# Patient Record
Sex: FEMALE | Race: WHITE | Employment: PART TIME | URBAN - METROPOLITAN AREA
[De-identification: names, ages, dates, MRNs, and addresses within clinical notes are randomized per-mention and may not be internally consistent; named-entity substitution may affect disease eponyms.]

---

## 2017-06-17 ENCOUNTER — HOSPITAL ENCOUNTER (EMERGENCY)
Facility: CLINIC | Age: 37
Discharge: HOME | End: 2017-06-18
Attending: EMERGENCY MEDICINE | Admitting: EMERGENCY MEDICINE
Payer: OTHER MISCELLANEOUS

## 2017-06-17 VITALS
HEART RATE: 67 BPM | DIASTOLIC BLOOD PRESSURE: 67 MMHG | RESPIRATION RATE: 16 BRPM | OXYGEN SATURATION: 98 % | TEMPERATURE: 98.6 F | SYSTOLIC BLOOD PRESSURE: 97 MMHG

## 2017-06-17 DIAGNOSIS — Z57.8 EMPLOYEE EXPOSURE TO BODY FLUIDS: ICD-10-CM

## 2017-06-17 PROCEDURE — 99282 EMERGENCY DEPT VISIT SF MDM: CPT | Mod: Z6 | Performed by: EMERGENCY MEDICINE

## 2017-06-17 PROCEDURE — 99283 EMERGENCY DEPT VISIT LOW MDM: CPT | Performed by: EMERGENCY MEDICINE

## 2017-06-17 RX ORDER — PROPARACAINE HYDROCHLORIDE 5 MG/ML
SOLUTION/ DROPS OPHTHALMIC
Status: DISCONTINUED
Start: 2017-06-17 | End: 2017-06-17 | Stop reason: HOSPADM

## 2017-06-17 SDOH — HEALTH STABILITY - PHYSICAL HEALTH: OCCUPATIONAL EXPOSURE TO OTHER RISK FACTORS: Z57.8

## 2017-06-17 ASSESSMENT — ENCOUNTER SYMPTOMS: EYE PAIN: 1

## 2017-06-17 NOTE — ED AVS SNAPSHOT
[facility], Emergency Department    [address] [facility] [address]    Phone:  [phone]                                       Lise Ronquillo   MRN: [id number]    Department:  [facility], Emergency Department   Date of Visit:  6/17/2017           Patient Information     Date Of Birth          1980        Your diagnoses for this visit were:     Employee exposure to body fluids        You were seen by Trini Alford MD.        Discharge Instructions       FOLLOW-UP:  Please make an appointment to follow up with:  - Your Primary Care Provider and Employee Health Services (phone: [phone])    RETURN TO THE EMERGENCY DEPARTMENT  Return to the Emergency Department at any time for new/worsening symptoms.       Body Fluid Exposure (Healthcare Worker)  Two serious illnesses can be transmitted to a healthcare worker through body fluid exposure:    HIV    Hepatitis (types B, C and D)  Most healthcare workers exposed to a patient's body fluid do not get infected. However, exposure must be taken very seriously. Both HIV and hepatitis virus infection can lead to chronic illness and death.  Transmission risk depends on the type of exposure.     The risk of transmission following a needle stick from an HIV positive source is 0.3% (3 out of 1000 exposures).    The risk of transmission following a mucous membrane exposure to an HIV positive source is 0.09% (9 out of 10,000 exposures).    Transmission risk from a hepatitis-B positive source to a non-immunized worker following a needle-stick injury is 6% to 30% (6 to 30 out of 100 exposures).    Transmission risk from a hepatitis C positive source following a needle-stick injury is 0% to 7% (0 to 7 out of 100 exposures), and rare with mucous membrane exposure.  If you are in a sexual relationship, discuss your exposure and its risks with your partner. Consider abstaining from sex or using condoms and avoiding pregnancy until test results of the person  who exposed you is negative, or your follow-up testing is done. Do not donate blood, tissue, or semen. If you are a woman who is breast feeding, discuss the risks to your infant with your doctor.  Testing  Initial testing for HIV and hepatitis status will be done both on you and the source, if known. This will establish your HIV and hepatitis status today. If the source is positive or unknown, and your initial results are negative, you will need follow-up blood tests to find out if transmission has occurred. It can take up to 8 weeks for blood tests to turn positive for hepatitis. If HIV infection has occurred, the test usually becomes positive by 3 months after exposure, but a positive result could be delayed up to 6 months after exposure. Therefore, repeat HIV testing may be done in 6 and 12 weeks, and again at 6 months after exposure, according to your employer's policies. If tests are negative for hepatitis and HIV on final follow-up testing, you can assume that you were not infected as a result of this exposure.  Post-exposure prophylaxis (PEP)  If you have not already been immunized against hepatitis B, you will be offered the vaccine. If you have already been immunized, your antibody status will be determined. Treatment advice will be given based on your antibody status and that of the source patient, if known.   There is no preventive treatment or vaccine for hepatitis C or D.    Based on the time since exposure, the type of  exposure and the HIV status of the patient, if known, preventive treatment with antiviral medicine may be offered. Treatment consists of 2 or 3 oral medicines taken 1 to 3 times a day for 4 weeks. It is recommended to start the treatment as soon as possible after the exposure. Since treatment may be started before test results are known, treatment can be stopped if the source patient test results are negative.  Facts you need to know before making a treatment decision    There is only  limited information about the effectiveness of drugs used for post-exposure prophylaxis  and their toxicity in persons without HIV infection.    Although the short-term toxicity of anti-viral drugs is usually limited, serious adverse events have occurred.    Be sure you understand the risk of transmission of disease and the risks of treatment before making your decision. If you are not sure, you can discuss this further with the Employee Health Staff. They can guide you to additional resources.    You may refuse or stop post-exposure prophylaxis treatment at any time.  When to seek medical advice  Call your healthcare provider right away if any of these occur:     Unexplained fever over 100.4 F (38.0 C) or as advised    Swollen lymph glands    Sore throat    Rash    Muscle or joint aching    Prolonged or recurring diarrhea, nausea, or vomiting    Frequent headaches    Dark urine or light colored stools; or, jaundice (yellow color to skin or eyes)    Abdominal pain    Unusual and prolonged fatigue  Date Last Reviewed: 7/1/2016 2000-2017 [employer]. [address] [place] [address]. All rights reserved. This information is not intended as a substitute for professional medical care. Always follow your healthcare professional's instructions.            24 Hour Appointment Hotline       To make an appointment at any [facility], call [phone] ([phone]). If you don't have a family doctor or clinic, we will help you find one. [facility] are conveniently located to serve the needs of you and your family.             Review of your medicines      Our records show that you are taking the medicines listed below. If these are incorrect, please call your family doctor or clinic.        Dose / Directions Last dose taken    * ibuprofen 600 MG tablet   Commonly known as:  ADVIL/MOTRIN   Dose:  600 mg   Quantity:  30 tablet        Take 1 tablet (600 mg) by mouth every 6 hours as  needed for moderate pain   Refills:  1        * ibuprofen 600 MG tablet   Commonly known as:  ADVIL/MOTRIN   Dose:  600 mg   Quantity:  30 tablet        Take 1 tablet (600 mg) by mouth every 6 hours as needed for moderate pain   Refills:  1        levothyroxine 175 MCG tablet   Commonly known as:  SYNTHROID/LEVOTHROID   Dose:  175 mcg        Take 175 mcg by mouth daily   Refills:  0        MAGIC MOUTHWASH (ENTER INGREDIENTS IN COMMENTS)   Dose:  5-10 mL   Quantity:  160 mL        Swish and spit 5-10 mLs in mouth every 6 hours as needed Pharmacy please compound 4 grams of carafate susp in 30 ml of Benadryl (12.5 mg/5 ml), 60 ml Maalox and 30 ml Viscous Lidocaine   Refills:  0        * Notice:  This list has 2 medication(s) that are the same as other medications prescribed for you. Read the directions carefully, and ask your doctor or other care provider to review them with you.            Orders Needing Specimen Collection     None      Pending Results     No orders found from 6/15/2017 to 6/18/2017.            Pending Culture Results     No orders found from 6/15/2017 to 6/18/2017.            Pending Results Instructions     If you had any lab results that were not finalized at the time of your Discharge, you can call the ED Lab Result RN at [phone]. You will be contacted by this team for any positive Lab results or changes in treatment. The nurses are available 7 days a week from 10A to 6:30P.  You can leave a message 24 hours per day and they will return your call.        Thank you for choosing [facility]       Thank you for choosing [facility] for your care. Our goal is always to provide you with excellent care. Hearing back from our patients is one way we can continue to improve our services. Please take a few minutes to complete the written survey that you may receive in the mail after you visit with us. Thank you!        [facility] Information     [employer] lets you send messages to your doctor, view your test  "results, renew your prescriptions, schedule appointments and more. To sign up, go to [email or web address] . Click on \"Log in\" on the left side of the screen, which will take you to the Welcome page. Then click on \"Sign up Now\" on the right side of the page.     You will be asked to enter the access code listed below, as well as some personal information. Please follow the directions to create your username and password.     Your access code is: [email or web address]  Expires: 9/15/2017 10:29 PM     Your access code will  in 90 days. If you need help or a new code, please call your [facility] clinic or [phone].        Care EveryWhere ID     This is your Care EveryWhere ID. This could be used by other organizations to access your [facility] medical records  [id number]        After Visit Summary       This is your record. Keep this with you and show to your community pharmacist(s) and doctor(s) at your next visit.                  "

## 2017-06-17 NOTE — ED AVS SNAPSHOT
[facility], [place], Emergency Department    [address] [place] [address]    Phone:  [phone]                                       Lise Ronquillo   MRN: [id number]    Department:  [facility], Emergency Department   Date of Visit:  6/17/2017           After Visit Summary Signature Page     I have received my discharge instructions, and my questions have been answered. I have discussed any challenges I see with this plan with the nurse or doctor.    ..........................................................................................................................................  Patient/Patient Representative Signature      ..........................................................................................................................................  Patient Representative Print Name and Relationship to Patient    ..................................................               ................................................  Date                                            Time    ..........................................................................................................................................  Reviewed by Signature/Title    ...................................................              ..............................................  Date                                                            Time

## 2017-06-18 NOTE — ED PROVIDER NOTES
History     Chief Complaint   Patient presents with     Body Fluid Exposure     HPI  Lise Ronquillo is a 37 year old female who previously healthy presents to the ED with body fluid exposure. 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     She states she had exposure in both of her eyes and in her mouth. . She states she had stinging in her eyes right after the incident but before rinsing them.  Her vision is appropriate.  It's not significant eye pain and she's not noted any redness.  She does not believe that she scratched her eyes or rubs her eyes after this incident.  She has already irrigated with 500 cc prior to arrival.    She otherwise denies wearing contacts or glasses, mouth sores, or obvious blood in gastric contents that hit her. She states she is up to date with her tetanus, otherwise generally healthy. No eye/mouth conditions or concerns.     She does not believe source pt has HIV, hepatitis, etc. Source pt is not high risk to her knowledge, but the source pt does reportedly have cryptogenic cirrhosis which is being worked up currently/worked up for liver transplant.     No other new symptoms or complaints at this time.  Please see ROS for further details.    PAST MEDICAL HISTORY  Past Medical History:   Diagnosis Date     Hypothyroid      PAST SURGICAL HISTORY  History reviewed. No pertinent surgical history.  FAMILY HISTORY  No family history on file.  SOCIAL HISTORY  Social History   Substance Use Topics     Smoking status: Never Smoker     Smokeless tobacco: Never Used     Alcohol use No     MEDICATIONS  Current Facility-Administered Medications   Medication     proparacaine (ALCAINE) 0.5 % ophthalmic solution     Current Outpatient Prescriptions   Medication     levothyroxine (SYNTHROID, LEVOTHROID) 175 MCG tablet     ibuprofen (ADVIL,MOTRIN) 600 MG tablet     ibuprofen  (ADVIL,MOTRIN) 600 MG tablet     MAGIC MOUTHWASH, ENTER INGREDIENTS IN COMMENTS,     ALLERGIES  Allergies   Allergen Reactions     No Known Drug Allergies        I have reviewed the Medications, Allergies, Past Medical and Surgical History, and Social History in the Epic system.    Review of Systems   HENT: Negative for mouth sores.    Eyes: Positive for pain (stinging in both).   All other systems reviewed and are negative.      Physical Exam   BP: 97/67  Pulse: 67  Temp: 98.6  F (37  C)  Resp: 16  SpO2: 98 %  Physical Exam   Constitutional: She appears well-developed and well-nourished. She is active.  Non-toxic appearance. She does not have a sickly appearance. She does not appear ill. No distress.   HENT:   Head: Normocephalic and atraumatic. Head is without raccoon's eyes, without Gil's sign, without abrasion, without contusion, without laceration, without right periorbital erythema and without left periorbital erythema. Hair is normal.   Right Ear: Hearing and external ear normal.   Left Ear: Hearing and external ear normal.   Nose: Nose normal. No rhinorrhea. No epistaxis.   Mouth/Throat: Mucous membranes are normal.   No mouth sores, no cracks in lips, etc.   Eyes: Conjunctivae, EOM and lids are normal. Pupils are equal, round, and reactive to light. Right eye exhibits no chemosis, no discharge and no exudate. No foreign body present in the right eye. Left eye exhibits no chemosis, no discharge and no exudate. No foreign body present in the left eye. Right conjunctiva is not injected. Right conjunctiva has no hemorrhage. Left conjunctiva is not injected. Left conjunctiva has no hemorrhage. No scleral icterus. Right eye exhibits normal extraocular motion and no nystagmus. Left eye exhibits normal extraocular motion and no nystagmus. Pupils are equal.   Slit lamp exam:       The right eye shows no corneal abrasion, no corneal flare, no corneal ulcer, no foreign body and no fluorescein uptake.        The  left eye shows no corneal abrasion, no corneal flare, no corneal ulcer, no foreign body and no fluorescein uptake.   Neck: Phonation normal.   Cardiovascular: Intact distal pulses.  Exam reveals no gallop and no friction rub.    No murmur heard.  Pulmonary/Chest: Effort normal. No stridor. No respiratory distress. She exhibits no tenderness.   Musculoskeletal: Normal range of motion. She exhibits no edema or tenderness.        Neurological: She is alert. She is not disoriented. She displays no tremor. She exhibits normal muscle tone.   Skin: Skin is warm and dry. No rash noted. She is not diaphoretic. No erythema. No pallor.   Psychiatric: She has a normal mood and affect. Her behavior is normal. Judgment and thought content normal.          ED Course     ED Course   Comment By Time   ID says if rapid HIV negative, don't start HIV PEP, if positive do start it. No other emergent recommendations. Trini Alford MD 06/17 2110   Discussed case with Nursing Supervisor.  They're just tearing of this event now.  They will be contacting the floor C unit as well as coming down to discuss with the patient here and help coordinate cares. Trini Alford MD 06/17 2146   2nd round of flushing has been performed. Trini Alford MD 06/17 2147   Eyes feel much better after 2nd flushing with sterile water.  Fluorescein exam performed with no abnormal uptake or obvious corneal injuries Trini Alford MD 06/17 2202   Just waiting on Nursing Supervisor to let us know the source patient's rapid HIV test was positive to determine whether or not we would need to start the patient on HIV postexposure prophylaxis Trini Alford MD 06/17 2203   Called by Nursing Supervisor, source patients initial HIV testing is negative. Trini Alford MD 06/17 5598     Procedures   8:58 PM  The patient was seen and examined by Dr. 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  On examination she has no conjunctival injection, no obvious gross abnormalities, PERRL, EOMI. Even after irrigation with a liter she is still having some very mild bilateral eye irritation.     PLAN: Irrigate with sterile water, fluorescein exam, discuss w/ ID    RESULTS:  See ED Course section above for particular pertinent findings and comments  - Labs: Post-exposure labs being ordered, pending  - Eye/fluorescein/pH exam: No abnormal uptake or abnormalities appreciated on eye exam with fluorescein.  pH appropriate on pH/paper testing    INTERVENTIONS:   - Eye irrigation    RE-EVALUATION:  - Patient's eyes did feel better after the 2nd round of irrigation with sterile water    DISCUSSIONS:  Discussed case with ID, Nursing Supervisor  - w/ ID: They recommend treatment with HIV PEP only if source patient's rapid HIV test is positive.  - w/ Patient: I have reviewed the available findings, plan, need for follow up, and strict return instructions with the patient.    DISPOSITION/PLANNING:  - FINAL IMPRESSION: Body fluid exposure  - DISPOSITION: D/C   --- Follow-up: with PCP and Employee health  --- Recommendations: Strict return instructions      ______________________________________________________________________________    - I have reviewed the available nursing notes.      New Prescriptions    No medications on file       Final diagnoses:   None     IYasmany, am serving as a trained medical scribe to document services personally performed by Trini Alford MD, based on the provider's statements to me.      Trini SALINAS MD, was physically present and have reviewed and verified the accuracy of this note documented by Yasmany Pérez.    6/17/2017   [facility], [place], EMERGENCY DEPARTMENT     Trini Alford MD  06/17/17  2060

## 2017-06-18 NOTE — DISCHARGE INSTRUCTIONS
FOLLOW-UP:  Please make an appointment to follow up with:  - Your Primary Care Provider and Employee Health Services (phone: [phone])    RETURN TO THE EMERGENCY DEPARTMENT  Return to the Emergency Department at any time for new/worsening symptoms.       Body Fluid Exposure (Healthcare Worker)  Two serious illnesses can be transmitted to a healthcare worker through body fluid exposure:    HIV    Hepatitis (types B, C and D)  Most healthcare workers exposed to a patient's body fluid do not get infected. However, exposure must be taken very seriously. Both HIV and hepatitis virus infection can lead to chronic illness and death.  Transmission risk depends on the type of exposure.     The risk of transmission following a needle stick from an HIV positive source is 0.3% (3 out of 1000 exposures).    The risk of transmission following a mucous membrane exposure to an HIV positive source is 0.09% (9 out of 10,000 exposures).    Transmission risk from a hepatitis-B positive source to a non-immunized worker following a needle-stick injury is 6% to 30% (6 to 30 out of 100 exposures).    Transmission risk from a hepatitis C positive source following a needle-stick injury is 0% to 7% (0 to 7 out of 100 exposures), and rare with mucous membrane exposure.  If you are in a sexual relationship, discuss your exposure and its risks with your partner. Consider abstaining from sex or using condoms and avoiding pregnancy until test results of the person who exposed you is negative, or your follow-up testing is done. Do not donate blood, tissue, or semen. If you are a woman who is breast feeding, discuss the risks to your infant with your doctor.  Testing  Initial testing for HIV and hepatitis status will be done both on you and the source, if known. This will establish your HIV and hepatitis status today. If the source is positive or unknown, and your initial results are negative, you will need follow-up blood tests to find out if  transmission has occurred. It can take up to 8 weeks for blood tests to turn positive for hepatitis. If HIV infection has occurred, the test usually becomes positive by 3 months after exposure, but a positive result could be delayed up to 6 months after exposure. Therefore, repeat HIV testing may be done in 6 and 12 weeks, and again at 6 months after exposure, according to your employer's policies. If tests are negative for hepatitis and HIV on final follow-up testing, you can assume that you were not infected as a result of this exposure.  Post-exposure prophylaxis (PEP)  If you have not already been immunized against hepatitis B, you will be offered the vaccine. If you have already been immunized, your antibody status will be determined. Treatment advice will be given based on your antibody status and that of the source patient, if known.   There is no preventive treatment or vaccine for hepatitis C or D.    Based on the time since exposure, the type of  exposure and the HIV status of the patient, if known, preventive treatment with antiviral medicine may be offered. Treatment consists of 2 or 3 oral medicines taken 1 to 3 times a day for 4 weeks. It is recommended to start the treatment as soon as possible after the exposure. Since treatment may be started before test results are known, treatment can be stopped if the source patient test results are negative.  Facts you need to know before making a treatment decision    There is only limited information about the effectiveness of drugs used for post-exposure prophylaxis  and their toxicity in persons without HIV infection.    Although the short-term toxicity of anti-viral drugs is usually limited, serious adverse events have occurred.    Be sure you understand the risk of transmission of disease and the risks of treatment before making your decision. If you are not sure, you can discuss this further with the Employee Health Staff. They can guide you to additional  resources.    You may refuse or stop post-exposure prophylaxis treatment at any time.  When to seek medical advice  Call your healthcare provider right away if any of these occur:     Unexplained fever over 100.4 F (38.0 C) or as advised    Swollen lymph glands    Sore throat    Rash    Muscle or joint aching    Prolonged or recurring diarrhea, nausea, or vomiting    Frequent headaches    Dark urine or light colored stools; or, jaundice (yellow color to skin or eyes)    Abdominal pain    Unusual and prolonged fatigue  Date Last Reviewed: 7/1/2016 2000-2017 [employer]. [address] [place] [address]. All rights reserved. This information is not intended as a substitute for professional medical care. Always follow your healthcare professional's instructions.

## 2017-06-18 NOTE — ED NOTES
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 Patient was exposed to bodily fluid when gastric contents sprayed into her mouth and eye from a GJtube. Eye was rinsed with 500cc NS upon arrival to ED.

## 2018-11-08 ENCOUNTER — OFFICE VISIT (OUTPATIENT)
Dept: DERMATOLOGY | Facility: CLINIC | Age: 38
End: 2018-11-08
Payer: COMMERCIAL

## 2018-11-08 DIAGNOSIS — D48.5 NEOPLASM OF UNCERTAIN BEHAVIOR OF SKIN: Primary | ICD-10-CM

## 2018-11-08 DIAGNOSIS — L29.9 PRURITUS: ICD-10-CM

## 2018-11-08 DIAGNOSIS — L21.9 DERMATITIS, SEBORRHEIC: ICD-10-CM

## 2018-11-08 DIAGNOSIS — L30.9 ECZEMA, UNSPECIFIED TYPE: ICD-10-CM

## 2018-11-08 LAB
BASOPHILS # BLD AUTO: 0.1 10E9/L (ref 0–0.2)
BASOPHILS NFR BLD AUTO: 2.4 %
DIFFERENTIAL METHOD BLD: NORMAL
EOSINOPHIL # BLD AUTO: 0.1 10E9/L (ref 0–0.7)
EOSINOPHIL NFR BLD AUTO: 2.6 %
ERYTHROCYTE [DISTWIDTH] IN BLOOD BY AUTOMATED COUNT: 13.5 % (ref 10–15)
FERRITIN SERPL-MCNC: 177 NG/ML (ref 12–150)
HCT VFR BLD AUTO: 44.6 % (ref 35–47)
HGB BLD-MCNC: 15 G/DL (ref 11.7–15.7)
IMM GRANULOCYTES # BLD: 0 10E9/L (ref 0–0.4)
IMM GRANULOCYTES NFR BLD: 0.2 %
IRON SATN MFR SERPL: 25 % (ref 15–46)
IRON SERPL-MCNC: 69 UG/DL (ref 35–180)
LYMPHOCYTES # BLD AUTO: 1.7 10E9/L (ref 0.8–5.3)
LYMPHOCYTES NFR BLD AUTO: 39.5 %
MCH RBC QN AUTO: 30.2 PG (ref 26.5–33)
MCHC RBC AUTO-ENTMCNC: 33.6 G/DL (ref 31.5–36.5)
MCV RBC AUTO: 90 FL (ref 78–100)
MONOCYTES # BLD AUTO: 0.5 10E9/L (ref 0–1.3)
MONOCYTES NFR BLD AUTO: 12.2 %
NEUTROPHILS # BLD AUTO: 1.8 10E9/L (ref 1.6–8.3)
NEUTROPHILS NFR BLD AUTO: 43.1 %
NRBC # BLD AUTO: 0 10*3/UL
NRBC BLD AUTO-RTO: 0 /100
PLATELET # BLD AUTO: 296 10E9/L (ref 150–450)
RBC # BLD AUTO: 4.97 10E12/L (ref 3.8–5.2)
T4 FREE SERPL-MCNC: 1.97 NG/DL (ref 0.76–1.46)
TIBC SERPL-MCNC: 280 UG/DL (ref 240–430)
TSH SERPL DL<=0.005 MIU/L-ACNC: 0.09 MU/L (ref 0.4–4)
WBC # BLD AUTO: 4.3 10E9/L (ref 4–11)

## 2018-11-08 RX ORDER — LIDOCAINE HYDROCHLORIDE AND EPINEPHRINE 10; 10 MG/ML; UG/ML
3 INJECTION, SOLUTION INFILTRATION; PERINEURAL ONCE
Qty: 3 ML | Refills: 0 | OUTPATIENT
Start: 2018-11-08 | End: 2019-02-27

## 2018-11-08 RX ORDER — KETOCONAZOLE 20 MG/ML
SHAMPOO TOPICAL DAILY PRN
Qty: 120 ML | Refills: 11 | Status: SHIPPED | OUTPATIENT
Start: 2018-11-08

## 2018-11-08 RX ORDER — FLUOCINONIDE TOPICAL SOLUTION USP, 0.05% 0.5 MG/ML
SOLUTION TOPICAL 2 TIMES DAILY
Qty: 60 ML | Refills: 3 | Status: SHIPPED | OUTPATIENT
Start: 2018-11-08

## 2018-11-08 RX ORDER — TRIAMCINOLONE ACETONIDE 1 MG/G
OINTMENT TOPICAL 2 TIMES DAILY
Qty: 454 G | Refills: 3 | Status: SHIPPED | OUTPATIENT
Start: 2018-11-08

## 2018-11-08 ASSESSMENT — PAIN SCALES - GENERAL
PAINLEVEL: NO PAIN (0)
PAINLEVEL: NO PAIN (0)

## 2018-11-08 NOTE — LETTER
11/8/2018       RE: Lise Ronquillo  [address]  [place] [address]     Dear Colleague,    Thank you for referring your patient, Lise Ronquillo, to the [facility] DERMATOLOGY at [facility]. Please see a copy of my visit note below.    [facility] Dermatology Note      Dermatology Problem List:  1. Benign nevi, s/p biopsy, outside facility, ~ 2007  2. Eczematous dermatitis with atopic diathesis  - involving antecubital fossae,vulvar skin, triamcinolone ointment BID to trunk and body   - lab evaluation today (11/8/18): CBC, TSH, iron studies, IgE  - patch test referral  3. Seborrheic dermatitis, fluocinonide solution and ketoconazole shampoo  4. Pigmented macule, NUB, s/p shave biopsy 11/8/18, path pending    Encounter Date: Nov 8, 2018    CC:   Chief Complaint   Patient presents with     Skin Check     Lise is here today for a skin check- notes some areas of concern.      History of Present Illness:  Ms. Lise Ronquillo is a 38 year old female who presents for skin check and in addition has some itchy spots. Her father has a history of eczema. She is not sure if she has a past history of eczema but has no seasonal allergies and no asthma. Her mom had skin cancer which she believes was NMSC. She feels overall well today.  She states she currently has itchy spots on her skin, marisel itchy external auditory canal and the skin around her left axilla. She will scratch this area on her chest by the axilla constantly.  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 She has tried eczema and psoriasis lotions and coconut oil. No topical steroids. She has similarly itchy vulvar skin and has tried over the counter terbinafine and butenafine topically. She had laser hair removal of under arms and bikini, last treatment 2012 ( 7 total treatments). She is otherwise well today with no additional skin  complaints.     Past Medical History:   Patient Active Problem List   Diagnosis     Anorexia     Past Medical History:   Diagnosis Date     Hypothyroid      History reviewed. No pertinent surgical history.    Social History:  Patient  reports that she has never smoked. She has never used smokeless tobacco. She reports that she does not drink alcohol or use illicit drugs.    Family History:  Family History   Problem Relation Age of Onset     Skin Cancer Mother      Melanoma No family hx of        Medications:  Current Outpatient Prescriptions   Medication Sig Dispense Refill     cholecalciferol (VITAMIN D3) 1000 UNIT tablet Take 1,000 Units by mouth       levothyroxine (SYNTHROID, LEVOTHROID) 175 MCG tablet Take 175 mcg by mouth daily       ibuprofen (ADVIL,MOTRIN) 600 MG tablet Take 1 tablet (600 mg) by mouth every 6 hours as needed for moderate pain (Patient not taking: Reported on 11/8/2018) 30 tablet 1     ibuprofen (ADVIL,MOTRIN) 600 MG tablet Take 1 tablet (600 mg) by mouth every 6 hours as needed for moderate pain (Patient not taking: Reported on 11/8/2018) 30 tablet 1     MAGIC MOUTHWASH, ENTER INGREDIENTS IN COMMENTS, Swish and spit 5-10 mLs in mouth every 6 hours as needed Pharmacy please compound 4 grams of carafate susp in 30 ml of Benadryl (12.5 mg/5 ml), 60 ml Maalox and 30 ml Viscous Lidocaine (Patient not taking: Reported on 11/8/2018) 160 mL 0        Allergies   Allergen Reactions     No Known Drug Allergies          Review of Systems:  -As per HPI  -Constitutional: The patient denies fatigue, fevers, chills, unintended weight loss, and night sweats.  -Skin: As above in HPI. No additional skin concerns.    Physical exam:  Vitals: There were no vitals taken for this visit.  GEN: This is a well developed, well-nourished female in no acute distress, in a pleasant mood.    SKIN: Total skin excluding the undergarment areas was performed. The exam included the head/face, neck, both arms, chest, back,  abdomen, both legs, digits and/or nails.   -There are lichenified thick pink scaly patches and plaques on the left upper chest near the axilla, lichenified vulvar skin.  - Rex naomi lines involving infraorbital skin  - densely pigmented dark brown/black macule  Of left medial thigh  - xerosis diffusely  - erythema of the scalp with perifollicular scale  -No other lesions of concern on areas examined.     Impression/Plan:  1. Seborrheic dermatitis    We will have her apply ketoconazole shampoo 2-3X per week to the scalp in the shower, leave on for 5 minutes, then wash out alternating with Fluocinonide solution 2-3 X weekly    2. Eczematous dermatitis    Axillary skin (left)- triamcinolone ointment BID two weeks, four week follow up    Ear canal use fluocinonide solution    Vulvar lichenification- triamcinolone ointment BID two weeks     Lab evaluation: including CBC, iron studies, TSH, IgE level    Ref for patch testing today     Follow up in 1 month    3. Neoplasm of uncertain behavior on the left medial thigh. The differential diagnosis includes nevus vs other.     Shave biopsy:  After discussion of benefits and risks including but not limited to bleeding/bruising, pain/swelling, infection, scar, incomplete removal, nerve damage/numbness, recurrence, and non-diagnostic biopsy, written consent, verbal consent and photographs were obtained. Time-out was performed. The area was cleaned with isopropyl alcohol.  was injected to obtain adequate anesthesia of the lesion on the left medial thigh. 0.5ml of 1% lidocaine with 1:100,000 epinephrine was injected to obtain adequate anesthesia. A  shave biopsy was performed. Hemostasis was achieved with aluminium chloride. Vaseline and a sterile dressing were applied. The patient tolerated the procedure and no complications were noted. The patient was provided with verbal and written post care instructions.        Follow-up in 1 months, earlier for new or changing lesions.        Dr. Dumont staffed the patient.    Staff Involved:  Resident(Donna Reynoso)/Staff(as above)      Patient was seen and examined with the dermatology resident. I agree with the history, review of systems, physical examination, assessments and plan. I was present for the key portion of the biopsy procedure.    Johanne Dumont MD  Professor and  Chair  Department of Dermatology  [facility]      Again, thank you for allowing me to participate in the care of your patient.      Sincerely,    Donna Reynoso MD

## 2018-11-08 NOTE — MR AVS SNAPSHOT
After Visit Summary   11/8/2018    Lise Ronquillo    MRN: [id number]           Patient Information     Date Of Birth          1980        Visit Information        Provider Department      11/8/2018 9:30 AM Donna Reynoso MD [facility] Dermatology        Today's Diagnoses     Neoplasm of uncertain behavior of skin    -  1    Pruritus        Eczema, unspecified type        Dermatitis, seborrheic          Care Instructions    For your scalp:  -fluocinonide solution 2-3X weekly  -Please apply 2-3X per week to the scalp in the shower, leave on for 5 minutes, then wash out    For your ear:  -you may apply the fluocinonide solution as needed    For your body and vulvar skin:  -you may apply triamcinolone ointment twice daily for two weeks (continue if necessary for another two week) then try and decrease to once daily for two weeks, etc. Then as needed.     For your itching in general:  -we will do some lab work today : CBC, TSH, iron studies, and IgE  - we will refer you for patch testing      Follow up in one month, sooner if needed.    We will contact you within two weeks with your biopsy results.     Wound Care After a Biopsy    What is a skin biopsy?  A skin biopsy allows the doctor to examine a very small piece of tissue under the microscope to determine the diagnosis and the best treatment for the skin condition. A local anesthetic (numbing medicine)  is injected with a very small needle into the skin area to be tested. A small piece of skin is taken from the area. Sometimes a suture (stitch) is used.     What are the risks of a skin biopsy?  I will experience scar, bleeding, swelling, pain, crusting and redness. I may experience incomplete removal or recurrence. Risks of this procedure are excessive bleeding, bruising, infection, nerve damage, numbness, thick (hypertrophic or keloidal) scar and non-diagnostic biopsy.    How should I care for my wound for the first 24 hours?    Keep the  wound dry and covered for 24 hours    If it bleeds, hold direct pressure on the area for 15 minutes. If bleeding does not stop then go to the emergency room    Avoid strenuous exercise the first 1-2 days or as your doctor instructs you    How should I care for the wound after 24 hours?    After 24 hours, remove the bandage    You may bathe or shower as normal    If you had a scalp biopsy, you can shampoo as usual and can use shower water to clean the biopsy site daily    Clean the wound twice a day with gentle soap and water    Do not scrub, be gentle    Apply white petroleum/Vaseline after cleaning the wound with a cotton swab or a clean finger, and keep the site covered with a Bandaid /bandage. Bandages are not necessary with a scalp biopsy    If you are unable to cover the site with a Bandaid /bandage, re-apply ointment 2-3 times a day to keep the site moist. Moisture will help with healing    Avoid strenuous activity for first 1-2 days    Avoid lakes, rivers, pools, and oceans until the stitches are removed or the site is healed    How do I clean my wound?    Wash hands thoroughly with soap or use hand  before all wound care    Clean the wound with gentle soap and water    Apply white petroleum/Vaseline  to wound after it is clean    Replace the Bandaid /bandage to keep the wound covered for the first few days or as instructed by your doctor    If you had a scalp biopsy, warm shower water to the area on a daily basis should suffice    What should I use to clean my wound?     Cotton-tipped applicators (Qtips )    White petroleum jelly (Vaseline ). Use a clean new container and use Q-tips to apply.    Bandaids   as needed    Gentle soap     How should I care for my wound long term?    Do not get your wound dirty    Keep up with wound care for one week or until the area is healed.    A small scab will form and fall off by itself when the area is completely healed. The area will be red and will become pink  in color as it heals. Sun protection is very important for how your scar will turn out. Sunscreen with an SPF 30 or greater is recommended once the area is healed.    You should have some soreness but it should be mild and slowly go away over several days. Talk to your doctor about using tylenol for pain,    When should I call my doctor?  If you have increased:     Pain or swelling    Pus or drainage (clear or slightly yellow drainage is ok)    Temperature over 100F    Spreading redness or warmth around wound    When will I hear about my results?  The biopsy results can take 2-3 weeks to come back. The clinic will call you with the results, send you a Sling message, or have you schedule a follow-up clinic or phone time to discuss the results. Contact our clinics if you do not hear from us in 3 weeks.     Who should I call with questions?    [facility]: [phone]     [facility]: [phone]    For urgent needs outside of business hours call the [facility] at [phone] and ask for the dermatology resident on call              Follow-ups after your visit        Follow-up notes from your care team     Return in about 4 weeks (around 12/6/2018).      Future tests that were ordered for you today     Open Future Orders        Priority Expected Expires Ordered    CBC with platelets differential Routine  11/8/2019 11/8/2018    Iron and iron binding capacity Routine  11/8/2019 11/8/2018    Ferritin Routine  11/8/2019 11/8/2018    IgE Routine  11/8/2019 11/8/2018    TSH with free T4 reflex Routine  11/8/2019 11/8/2018            Who to contact     Please call your clinic at [phone] to:    Ask questions about your health    Make or cancel appointments    Discuss your medicines    Learn about your test results    Speak to your doctor            Additional Information About Your Visit        Your [employer] Player Connect Information     [email or web address] is an electronic  gateway that provides easy, online access to your medical records. With [employer], you can request a clinic appointment, read your test results, renew a prescription or communicate with your care team.     To sign up for [employer] visit the website at [email or web address]   You will be asked to enter the access code listed below, as well as some personal information. Please follow the directions to create your username and password.     Your access code is: [email or web address]  Expires: 2019  9:24 AM     Your access code will  in 90 days. If you need help or a new code, please contact your [facility] or call [phone] for assistance.        Care EveryWhere ID     This is your Care EveryWhere ID. This could be used by other organizations to access your [place] medical records  [id number]         Blood Pressure from Last 3 Encounters:   17 97/67   16 94/62   09/12/15 114/68    Weight from Last 3 Encounters:   16 73.4 kg (161 lb 12.8 oz)   09/12/15 63.9 kg (140 lb 14.4 oz)   07/02/15 60.5 kg (133 lb 4.8 oz)              We Performed the Following     BIOPSY SKIN/SUBQ/MUC MEM, SINGLE LESION     Dermatological path order and indications          Today's Medication Changes          These changes are accurate as of 18 10:30 AM.  If you have any questions, ask your nurse or doctor.               Start taking these medicines.        Dose/Directions    fluocinonide 0.05 % solution   Commonly known as:  LIDEX   Used for:  Eczema, unspecified type, Dermatitis, seborrheic   Started by:  Donna Reynoso MD        Apply topically 2 times daily   Quantity:  60 mL   Refills:  3       ketoconazole 2 % shampoo   Commonly known as:  NIZORAL   Used for:  Dermatitis, seborrheic   Started by:  Donna Reynoso MD        Apply topically daily as needed for itching or irritation   Quantity:  120 mL   Refills:  11       lidocaine 1% with EPINEPHrine 1:100,000 1  %-1:007776 injection   Used for:  Neoplasm of uncertain behavior of skin   Started by:  Donna Reynoso MD        Dose:  3 mL   Inject 3 mLs into the skin once for 1 dose   Quantity:  3 mL   Refills:  0       triamcinolone 0.1 % ointment   Commonly known as:  KENALOG   Used for:  Eczema, unspecified type   Started by:  Donna Reynoso MD        Apply topically 2 times daily   Quantity:  454 g   Refills:  3            Where to get your medicines      These medications were sent to [place], [place] - [address]  [address], [place] [address]    Hours:  TRANSPLANT PHONE NUMBER [phone] Phone:  [phone]     fluocinonide 0.05 % solution    ketoconazole 2 % shampoo    triamcinolone 0.1 % ointment         Some of these will need a paper prescription and others can be bought over the counter.  Ask your nurse if you have questions.     You don't need a prescription for these medications     lidocaine 1% with EPINEPHrine 1:100,000 1 %-1:[id number] injection                Primary Care Provider Fax #    Physician No Ref-Primary [phone]       No address on file        Equal Access to Services     DUSTIN YEN AH: Hadii mayra solitario Soanuja, waaxda luqadaha, qaybta kaalmada adeegyada, americo tobias. So [facility] [phone].    ATENCIÓN: Si habla español, tiene a new disposición servicios gratHoly Cross Hospitalos de asistencia lingüística. Llame al [phone].    We comply with applicable federal civil rights laws and [place] laws. We do not discriminate on the basis of race, color, national origin, age, disability, sex, sexual orientation, or gender identity.            Thank you!     Thank you for choosing [facility] DERMATOLOGY  for your care. Our goal is always to provide you with excellent care. Hearing back from our patients is one way we can continue to improve our services. Please take a few minutes to complete the  written survey that you may receive in the mail after your visit with us. Thank you!             Your Updated Medication List - Protect others around you: Learn how to safely use, store and throw away your medicines at www.disposemymeds.org.          This list is accurate as of 11/8/18 10:30 AM.  Always use your most recent med list.                   Brand Name Dispense Instructions for use Diagnosis    cholecalciferol 1000 UNIT tablet    vitamin D3     Take 1,000 Units by mouth        fluocinonide 0.05 % solution    LIDEX    60 mL    Apply topically 2 times daily    Eczema, unspecified type, Dermatitis, seborrheic       * ibuprofen 600 MG tablet    ADVIL/MOTRIN    30 tablet    Take 1 tablet (600 mg) by mouth every 6 hours as needed for moderate pain    Headache(784.0), Acute pharyngitis, unspecified pharyngitis type, Mouth pain       * ibuprofen 600 MG tablet    ADVIL/MOTRIN    30 tablet    Take 1 tablet (600 mg) by mouth every 6 hours as needed for moderate pain    Pain and swelling of left ankle, Left ankle pain       ketoconazole 2 % shampoo    NIZORAL    120 mL    Apply topically daily as needed for itching or irritation    Dermatitis, seborrheic       levothyroxine 175 MCG tablet    SYNTHROID/LEVOTHROID     Take 175 mcg by mouth daily        lidocaine 1% with EPINEPHrine 1:100,000 1 %-1:092439 injection     3 mL    Inject 3 mLs into the skin once for 1 dose    Neoplasm of uncertain behavior of skin       magic mouthwash suspension    ENTER INGREDIENTS IN COMMENTS    160 mL    Swish and spit 5-10 mLs in mouth every 6 hours as needed Pharmacy please compound 4 grams of carafate susp in 30 ml of Benadryl (12.5 mg/5 ml), 60 ml Maalox and 30 ml Viscous Lidocaine    Acute pharyngitis, unspecified pharyngitis type, Mouth pain       triamcinolone 0.1 % ointment    KENALOG    454 g    Apply topically 2 times daily    Eczema, unspecified type       * Notice:  This list has 2 medication(s) that are the same as other  medications prescribed for you. Read the directions carefully, and ask your doctor or other care provider to review them with you.

## 2018-11-08 NOTE — PATIENT INSTRUCTIONS
For your scalp:  -fluocinonide solution 2-3X weekly  -Please apply 2-3X per week to the scalp in the shower, leave on for 5 minutes, then wash out    For your ear:  -you may apply the fluocinonide solution as needed    For your body and vulvar skin:  -you may apply triamcinolone ointment twice daily for two weeks (continue if necessary for another two week) then try and decrease to once daily for two weeks, etc. Then as needed.     For your itching in general:  -we will do some lab work today : CBC, TSH, iron studies, and IgE  - we will refer you for patch testing      Follow up in one month, sooner if needed.    We will contact you within two weeks with your biopsy results.     Wound Care After a Biopsy    What is a skin biopsy?  A skin biopsy allows the doctor to examine a very small piece of tissue under the microscope to determine the diagnosis and the best treatment for the skin condition. A local anesthetic (numbing medicine)  is injected with a very small needle into the skin area to be tested. A small piece of skin is taken from the area. Sometimes a suture (stitch) is used.     What are the risks of a skin biopsy?  I will experience scar, bleeding, swelling, pain, crusting and redness. I may experience incomplete removal or recurrence. Risks of this procedure are excessive bleeding, bruising, infection, nerve damage, numbness, thick (hypertrophic or keloidal) scar and non-diagnostic biopsy.    How should I care for my wound for the first 24 hours?    Keep the wound dry and covered for 24 hours    If it bleeds, hold direct pressure on the area for 15 minutes. If bleeding does not stop then go to the emergency room    Avoid strenuous exercise the first 1-2 days or as your doctor instructs you    How should I care for the wound after 24 hours?    After 24 hours, remove the bandage    You may bathe or shower as normal    If you had a scalp biopsy, you can shampoo as usual and can use shower water to clean the  biopsy site daily    Clean the wound twice a day with gentle soap and water    Do not scrub, be gentle    Apply white petroleum/Vaseline after cleaning the wound with a cotton swab or a clean finger, and keep the site covered with a Bandaid /bandage. Bandages are not necessary with a scalp biopsy    If you are unable to cover the site with a Bandaid /bandage, re-apply ointment 2-3 times a day to keep the site moist. Moisture will help with healing    Avoid strenuous activity for first 1-2 days    Avoid lakes, rivers, pools, and oceans until the stitches are removed or the site is healed    How do I clean my wound?    Wash hands thoroughly with soap or use hand  before all wound care    Clean the wound with gentle soap and water    Apply white petroleum/Vaseline  to wound after it is clean    Replace the Bandaid /bandage to keep the wound covered for the first few days or as instructed by your doctor    If you had a scalp biopsy, warm shower water to the area on a daily basis should suffice    What should I use to clean my wound?     Cotton-tipped applicators (Qtips )    White petroleum jelly (Vaseline ). Use a clean new container and use Q-tips to apply.    Bandaids   as needed    Gentle soap     How should I care for my wound long term?    Do not get your wound dirty    Keep up with wound care for one week or until the area is healed.    A small scab will form and fall off by itself when the area is completely healed. The area will be red and will become pink in color as it heals. Sun protection is very important for how your scar will turn out. Sunscreen with an SPF 30 or greater is recommended once the area is healed.    You should have some soreness but it should be mild and slowly go away over several days. Talk to your doctor about using tylenol for pain,    When should I call my doctor?  If you have increased:     Pain or swelling    Pus or drainage (clear or slightly yellow drainage is  ok)    Temperature over 100F    Spreading redness or warmth around wound    When will I hear about my results?  The biopsy results can take 2-3 weeks to come back. The clinic will call you with the results, send you a Mindiet message, or have you schedule a follow-up clinic or phone time to discuss the results. Contact our clinics if you do not hear from us in 3 weeks.     Who should I call with questions?    [facility]: [phone]     [facility]: [phone]    For urgent needs outside of business hours call the [facility] at [phone] and ask for the dermatology resident on call

## 2018-11-08 NOTE — LETTER
Date:November 30, 2018      Patient was self referred, no letter generated. Do not send.        [facility] Information

## 2018-11-08 NOTE — PROGRESS NOTES
[facility] Dermatology Note      Dermatology Problem List:  1. Benign nevi, s/p biopsy, outside facility, ~ 2007  2. Eczematous dermatitis with atopic diathesis  - involving antecubital fossae,vulvar skin, triamcinolone ointment BID to trunk and body   - lab evaluation today (11/8/18): CBC, TSH, iron studies, IgE  - patch test referral  3. Seborrheic dermatitis, fluocinonide solution and ketoconazole shampoo  4. Pigmented macule, NUB, s/p shave biopsy 11/8/18, path pending    Encounter Date: Nov 8, 2018    CC:   Chief Complaint   Patient presents with     Skin Check     Lise is here today for a skin check- notes some areas of concern.      History of Present Illness:  Ms. Lise Ronquillo is a 38 year old female who presents for skin check and in addition has some itchy spots. Her father has a history of eczema. She is not sure if she has a past history of eczema but has no seasonal allergies and no asthma. Her mom had skin cancer which she believes was NMSC. She feels overall well today.  She states she currently has itchy spots on her skin, marisel itchy external auditory canal and the skin around her left axilla. She will scratch this area on her chest by the axilla constantly.  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 She has tried eczema and psoriasis lotions and coconut oil. No topical steroids. She has similarly itchy vulvar skin and has tried over the counter terbinafine and butenafine topically. She had laser hair removal of under arms and bikini, last treatment 2012 ( 7 total treatments). She is otherwise well today with no additional skin complaints.     Past Medical History:   Patient Active Problem List   Diagnosis     Anorexia     Past Medical History:   Diagnosis Date     Hypothyroid      History reviewed. No pertinent surgical history.    Social History:  Patient  reports that she has never smoked. She has never used smokeless  tobacco. She reports that she does not drink alcohol or use illicit drugs.    Family History:  Family History   Problem Relation Age of Onset     Skin Cancer Mother      Melanoma No family hx of        Medications:  Current Outpatient Prescriptions   Medication Sig Dispense Refill     cholecalciferol (VITAMIN D3) 1000 UNIT tablet Take 1,000 Units by mouth       levothyroxine (SYNTHROID, LEVOTHROID) 175 MCG tablet Take 175 mcg by mouth daily       ibuprofen (ADVIL,MOTRIN) 600 MG tablet Take 1 tablet (600 mg) by mouth every 6 hours as needed for moderate pain (Patient not taking: Reported on 11/8/2018) 30 tablet 1     ibuprofen (ADVIL,MOTRIN) 600 MG tablet Take 1 tablet (600 mg) by mouth every 6 hours as needed for moderate pain (Patient not taking: Reported on 11/8/2018) 30 tablet 1     MAGIC MOUTHWASH, ENTER INGREDIENTS IN COMMENTS, Swish and spit 5-10 mLs in mouth every 6 hours as needed Pharmacy please compound 4 grams of carafate susp in 30 ml of Benadryl (12.5 mg/5 ml), 60 ml Maalox and 30 ml Viscous Lidocaine (Patient not taking: Reported on 11/8/2018) 160 mL 0        Allergies   Allergen Reactions     No Known Drug Allergies          Review of Systems:  -As per HPI  -Constitutional: The patient denies fatigue, fevers, chills, unintended weight loss, and night sweats.  -Skin: As above in HPI. No additional skin concerns.    Physical exam:  Vitals: There were no vitals taken for this visit.  GEN: This is a well developed, well-nourished female in no acute distress, in a pleasant mood.    SKIN: Total skin excluding the undergarment areas was performed. The exam included the head/face, neck, both arms, chest, back, abdomen, both legs, digits and/or nails.   -There are lichenified thick pink scaly patches and plaques on the left upper chest near the axilla, lichenified vulvar skin.  - Rex naomi lines involving infraorbital skin  - densely pigmented dark brown/black macule  Of left medial thigh  - xerosis  diffusely  - erythema of the scalp with perifollicular scale  -No other lesions of concern on areas examined.     Impression/Plan:  1. Seborrheic dermatitis    We will have her apply ketoconazole shampoo 2-3X per week to the scalp in the shower, leave on for 5 minutes, then wash out alternating with Fluocinonide solution 2-3 X weekly    2. Eczematous dermatitis    Axillary skin (left)- triamcinolone ointment BID two weeks, four week follow up    Ear canal use fluocinonide solution    Vulvar lichenification- triamcinolone ointment BID two weeks     Lab evaluation: including CBC, iron studies, TSH, IgE level    Ref for patch testing today     Follow up in 1 month    3. Neoplasm of uncertain behavior on the left medial thigh. The differential diagnosis includes nevus vs other.     Shave biopsy:  After discussion of benefits and risks including but not limited to bleeding/bruising, pain/swelling, infection, scar, incomplete removal, nerve damage/numbness, recurrence, and non-diagnostic biopsy, written consent, verbal consent and photographs were obtained. Time-out was performed. The area was cleaned with isopropyl alcohol.  was injected to obtain adequate anesthesia of the lesion on the left medial thigh. 0.5ml of 1% lidocaine with 1:100,000 epinephrine was injected to obtain adequate anesthesia. A  shave biopsy was performed. Hemostasis was achieved with aluminium chloride. Vaseline and a sterile dressing were applied. The patient tolerated the procedure and no complications were noted. The patient was provided with verbal and written post care instructions.        Follow-up in 1 months, earlier for new or changing lesions.       Dr. Dumont staffed the patient.    Staff Involved:  Resident(Donna Reynoso)/Staff(as above)      Patient was seen and examined with the dermatology resident. I agree with the history, review of systems, physical examination, assessments and plan. I was present for the key portion of the  biopsy procedure.    Johanne Dumont MD  Professor and  Chair  Department of Dermatology  [facility]

## 2018-11-08 NOTE — NURSING NOTE
Lidocaine-epinephrine 1-1:196907 % injection   05mL once for one use, starting 11/8/2018 ending 11/8/2018,  2mL disp, R-0, injection  Injected by Dr. Reynoso

## 2018-11-11 ENCOUNTER — HEALTH MAINTENANCE LETTER (OUTPATIENT)
Age: 38
End: 2018-11-11

## 2018-11-12 ENCOUNTER — PRE VISIT (OUTPATIENT)
Dept: DERMATOLOGY | Facility: CLINIC | Age: 38
End: 2018-11-12

## 2018-11-12 LAB — IGE SERPL-ACNC: 48 KIU/L (ref 0–114)

## 2018-11-14 LAB — COPATH REPORT: NORMAL

## 2018-11-15 ENCOUNTER — TELEPHONE (OUTPATIENT)
Dept: DERMATOLOGY | Facility: CLINIC | Age: 38
End: 2018-11-15

## 2018-11-15 NOTE — TELEPHONE ENCOUNTER
FUTURE VISIT INFORMATION      FUTURE VISIT INFORMATION:    Date: 11/19    Time: 8a    Location: Derm  REFERRAL INFORMATION:    Referring provider:       Referring providers clinic:       Reason for visit/diagnosis  NAL    RECORDS REQUESTED FROM:       Clinic name Comments Records Status Imaging Status                                         Records Internal

## 2018-11-15 NOTE — TELEPHONE ENCOUNTER
Left voicemail regarding appointment on 11/19/2018 at 8 AM. Call back number provided for questions or rescheduling.

## 2018-11-19 ENCOUNTER — PRE VISIT (OUTPATIENT)
Dept: DERMATOLOGY | Facility: CLINIC | Age: 38
End: 2018-11-19

## 2018-11-27 NOTE — TELEPHONE ENCOUNTER
FUTURE VISIT INFORMATION      FUTURE VISIT INFORMATION:    Date: 12/3    Time: 9a    Location: Derm  REFERRAL INFORMATION:    Referring provider:  per patient    Referring providers clinic:       Reason for visit/diagnosis  NAL    RECORDS REQUESTED FROM:       Clinic name Comments Records Status Imaging Status                                         All Records Internal

## 2018-11-29 ENCOUNTER — TELEPHONE (OUTPATIENT)
Dept: DERMATOLOGY | Facility: CLINIC | Age: 38
End: 2018-11-29

## 2018-11-29 PROBLEM — L21.9 DERMATITIS, SEBORRHEIC: Status: ACTIVE | Noted: 2018-11-29

## 2018-11-29 PROBLEM — L30.9 ECZEMA, UNSPECIFIED TYPE: Status: ACTIVE | Noted: 2018-11-29

## 2018-11-29 PROBLEM — D48.5 NEOPLASM OF UNCERTAIN BEHAVIOR OF SKIN: Status: ACTIVE | Noted: 2018-11-29

## 2018-11-29 PROBLEM — L29.9 PRURITUS: Status: ACTIVE | Noted: 2018-11-29

## 2018-12-03 ENCOUNTER — PRE VISIT (OUTPATIENT)
Dept: DERMATOLOGY | Facility: CLINIC | Age: 38
End: 2018-12-03

## 2019-01-03 ENCOUNTER — TELEPHONE (OUTPATIENT)
Dept: DERMATOLOGY | Facility: CLINIC | Age: 39
End: 2019-01-03

## 2019-01-03 NOTE — TELEPHONE ENCOUNTER
Left voicemail regarding appointment on 1/9/19 at 1300. Call back number provided for questions or rescheduling.

## 2019-01-09 ENCOUNTER — OFFICE VISIT (OUTPATIENT)
Dept: DERMATOLOGY | Facility: CLINIC | Age: 39
End: 2019-01-09
Payer: COMMERCIAL

## 2019-01-09 DIAGNOSIS — L20.89 FLEXURAL ATOPIC DERMATITIS: ICD-10-CM

## 2019-01-09 DIAGNOSIS — L23.89 ALLERGIC CONTACT DERMATITIS DUE TO OTHER AGENTS: Primary | ICD-10-CM

## 2019-01-09 ASSESSMENT — PAIN SCALES - GENERAL: PAINLEVEL: NO PAIN (0)

## 2019-01-09 NOTE — NURSING NOTE
Allergy Rooming Note    Lise Ronquillo's goals for this visit include:   Chief Complaint   Patient presents with     Allergies     Julieta wilkerson for an allergy consult     Amada Paniagua LPN

## 2019-01-09 NOTE — LETTER
1/9/2019       RE: Lise Ronquillo  [address]  [place] [address]     Dear Colleague,    Thank you for referring your patient, Lise Ronquillo, to the [facility] DERMATOLOGY at [facility]. Please see a copy of my visit note below.    [facility] Allergy Note      Allergy Problem List:    Specialty Problems        Dermatology Diagnoses    Dermatitis, seborrheic        Eczema, unspecified type        Neoplasm of uncertain behavior of skin        Pruritus            1. Benign nevi, s/p biopsy, outside facility, ~ 2007  2. Eczematous dermatitis with atopic diathesis  - involving antecubital fossae,vulvar skin, triamcinolone ointment BID to trunk and body   - lab evaluation today (11/8/18): CBC, TSH, iron studies, IgE  - patch test referral  3. Seborrheic dermatitis, fluocinonide solution and ketoconazole shampoo  4. Pigmented macule, NUB, s/p shave biopsy 11/8/18, path pending = dysplastic naevus with mild atypia    CC:   Allergies (Julieta wilkerson for an allergy consult)        Encounter Date: Jan 9, 2019    History of Present Illness:    Ms. Lise Ronquillo is a 38 year old female who presents for skin check and in addition has some itchy spots. Her father has a history of eczema. She is not sure if she has a past history of eczema but has no seasonal allergies and no asthma. Her mom had skin cancer which she believes was NMSC. She feels overall well today.  She states she currently has itchy spots on her skin, marisel itchy external auditory canal and the skin around her left axilla. She will scratch this area on her chest by the axilla constantly.  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 She has tried eczema and psoriasis lotions and coconut oil. No topical steroids. She has similarly itchy vulvar skin and has tried over the counter terbinafine and butenafine topically. She had laser hair  removal of under arms and bikini, last treatment 2012 ( 7 total treatments). She is otherwise well today with no additional skin complaints.     Since childhood recurrent eczemas, initially in flexural areas and recently more on hands and axillar and groin area. Axillar lesions exclusively on left side and left elbow flexural area, groin area both sides and palm of hands right>>left (thenar palm and side of fingers). Both ear canals get very itchy and flaky. Many years as well itchy scalp.  --> with treatment with Triamcinolone on hands and axilla and groins and Fluocinonide sol on scalp and ear canal much less itching (about 3x weekly).    Used in Axilla organic deodorant (Schmetz), shampoo = Heavenly essence shampoo, soap = shea moisture (coconut, Hibiscus, shea butter); collagen body lotion, Emu oil, DM Plus serum    Past Medical History:   Patient Active Problem List   Diagnosis     Anorexia     Eczema, unspecified type     Dermatitis, seborrheic     Pruritus     Neoplasm of uncertain behavior of skin     Past Medical History:   Diagnosis Date     Hypothyroid        Allergy History:     Allergies   Allergen Reactions     No Known Drug Allergies    no Asthma  Sometimes in the morning some stuffy nose, no sneezing and some red eyes (no pets)  Flexural eczema since childhood.    Social History:  The patient works as a nurse in ICU. Patient has the following hobbies or non-occupational exposure: running     reports that  has never smoked. she has never used smokeless tobacco. She reports that she does not drink alcohol or use drugs.      Family History:  Family History   Problem Relation Age of Onset     Skin Cancer Mother      Melanoma No family hx of    father has eczemas    Medications:  Current Outpatient Medications   Medication Sig Dispense Refill     cholecalciferol (VITAMIN D3) 1000 UNIT tablet Take 1,000 Units by mouth       fluocinonide (LIDEX) 0.05 % solution Apply topically 2 times daily 60 mL 3      ibuprofen (ADVIL,MOTRIN) 600 MG tablet Take 1 tablet (600 mg) by mouth every 6 hours as needed for moderate pain (Patient not taking: Reported on 11/8/2018) 30 tablet 1     ibuprofen (ADVIL,MOTRIN) 600 MG tablet Take 1 tablet (600 mg) by mouth every 6 hours as needed for moderate pain (Patient not taking: Reported on 11/8/2018) 30 tablet 1     ketoconazole (NIZORAL) 2 % shampoo Apply topically daily as needed for itching or irritation 120 mL 11     levothyroxine (SYNTHROID, LEVOTHROID) 175 MCG tablet Take 175 mcg by mouth daily       MAGIC MOUTHWASH, ENTER INGREDIENTS IN COMMENTS, Swish and spit 5-10 mLs in mouth every 6 hours as needed Pharmacy please compound 4 grams of carafate susp in 30 ml of Benadryl (12.5 mg/5 ml), 60 ml Maalox and 30 ml Viscous Lidocaine (Patient not taking: Reported on 11/8/2018) 160 mL 0     triamcinolone (KENALOG) 0.1 % ointment Apply topically 2 times daily 454 g 3           Review of Systems:  -As per HPI  -Constitutional: The patient denies fatigue, fevers, chills, unintended weight loss, and night sweats.  -HEENT: Patient denies nonhealing oral sores.  -Skin: As above in HPI. No additional skin concerns.    Physical exam:  Vitals: There were no vitals taken for this visit.  GEN: This is a well developed, well-nourished female in no acute distress, in a pleasant mood.    SKIN: Waist-up skin, which includes the head/face, neck, both arms, chest, back, abdomen, digits and/or nails was examined.  Now only postinflammatory hyperpigmentation axillar ==> in photo from November 2018 subacute-chronic eczema (rather not fungal).   Inguinal now only hyperpigmentation   Right hand over thenar area some dry, hyperkeratotic, slightly red lesions and dry without much erythema over the sides of fingers    -No other lesions of concern on areas examined.     Allergy Tests:    Past Allergy Test: never done    Current Allergy Test:       Order for PATCH TESTS    [x] Outpatient  [] Inpatient: [place]..../  Bed ....      Skin Atopy (atopic dermatitis) [x] Yes   [] No  Rhinitis/Sinusitis:   [x] Yes   [] No  Allergic Asthma:   [] Yes   [x] No  Food Allergy:   [] Yes   [x] No  Leg ulcers:   [] Yes   [x] No  Hand eczema:   [x] Yes   [] No   Leading hand:   [x] R   [] L       [] Ambidextrous                        Reason for tests (suspected allergy): allergic contact dermatitis to topical cosmetics/soaps/fragrances  Known previous allergies: had some reaction to the central part of fit bit    Standardized panels  [x] Standard panel (40 tests)  [x] Preservatives & Antimicrobials (31 tests)  [x] Emulsifiers & Additives (25 tests)   [x] Perfumes/Flavours & Plants (25 tests)  [] Hairdresser panel (12 tests)  [] Rubber Chemicals (22 tests)  [] Plastics (26 tests)  [] Colorants/Dyes/Food additives (20 tests)  [] Metals (implants/dental) (23 tests)  [] Local anaesthetics/NSAIDs (12 tests)  [] Antibiotics & Antimycotics (14 tests)   [] Corticosteroids (15 tests)   [] Photopatch test (32 tests)   [] others: ...      [x] Patient's own products: Shampoo, body wash and deodorant --> fit bit plastic or metal??    DO NOT test if chemical or biological identity is unknown!     always ask from patient the product information and safety sheets (MSDS)     [] Patient needs consultation with Allergy team (changes of tests may apply)  [x] Tests discussed with Allergy team (can have direct appointment for patch tests)     Maybe later perform prick test atopy screen    Impression/Plan:    1) atopic predisposition with   - atopic dermatitis (flexural) since childhood   - maybe perennial rhinitis (HDM)    2) allergic contact dermatitis (hand, vulva, groins, axilla, scalp, ears) to soaps, disinfectants, fragrances DDx atopic dermatitis      ==> try to use in future Vanicream cream and Free&Clear Shampoo, soap and conditioner  ==> if necessary use corticosteroids (triamcinolone oint and Fluocinonide sol)      Follow-up for patch tests    I spent a  total of 25 min face to face with Lise Ronquillo during today's office visit. About 50% of the time was spent counseling the patient and/or coordinating care regarding their allergy.        Again, thank you for allowing me to participate in the care of your patient.      Sincerely,    Tan Awad MD

## 2019-01-09 NOTE — PATIENT INSTRUCTIONS
Patch Testing Information  What are allergen patch tests?    The test is done to look for skin allergies that may be causing rashes and irritation.    A patch test is a way of identifying whether a substance has caused a delayed reaction with skin inflammation, such as contact eczema or delayed (after days) reactions to drugs.     We will use various types of test compounds, which may include common allergens you may come in contact with in daily life such as preservatives, fragrances or even drugs.     Most of the time we will use standardized, prefabricated test solutions. The choice of the substances and series tested will depend on your history of reactions. Sometimes we will test your own products as well.     In order to avoid severe toxic reactions we need detailed information or safety sheets for each of the test compounds.    The test panels are set up with a small amount of common substances that cause skin allergies. They are taped to your skin with a clear hypoallergenic bandage and reinforced hypoallergenic tape.    The substances are numbered, so it is easy to tell what is causing a skin reaction.  What do I need to do in preparation for the test?    Stop all systemic steroids 1 month prior to the testing.     Stop applying topical steroids to the test area one week prior to the test. It is to use them elsewhere throughout the testing process. If this is not possible then discuss options with the Allergy specialist.    Do not go sunbathing or tanning for one week prior to testing    It is okay to take antihistamines as normal.     Please wear dark colors the week of the test since we will write on you with a dark marker that may transfer and stain clothing or bedding.     Some medications can affect the reactions to allergens during the tests. Therefore reveal all the medications you take to the allergy team. The doctor will discuss the medications with you before the tests.     Eat how you normally  would.    Avoid the following:    You cannot get the test area wet during the entire test period. This means no bathing or swimming the entire test period.    No strenuous exercise that may cause sweating.    Do not scratch the test area, this can cause the allergen to spread and give us false positives.     Avoid exposure to UV irradiation. This means no tanning or UV treatment during the testing period.   What can I expect?    Patch testing is done over three appointments.   o The usual schedule is: Monday (Allergen patches are placed), Wednesday (Patches are removed and skin is examined by the MD), and Friday (Skin is examined by the MD)      If you are allergic, there will be an area of irritation where the test was placed.    Itching or burning at the test site might happen if you are allergic to the allergen.  Do not rub or scratch the test site since this may spread the allergen and possibly cause false positives. If itching or burning is not tolerable please contact the clinic.    The marker we draw on your back with ma take up to 5 weeks to wash off completely. Rubbing alcohol can help speed up this process.     Reactions can occur 1 to 2 weeks after the tests are applied. If this happens please take photos of the area and contact the clinic.  What should I do after the tests are placed?    Keep the area dry. No showering or getting the test area wet from the time we see you at your first visit until after your third appointment. If you get the test area wet you are washing off the test and we could get false negative reactions.    If you notice any of the test patches coming loose put on more tape to re-secure the test.    If the marker that is applied fades you can use a dark pen to tatyana around the panel sites.    Cover the test area when you are outside to avoid any sun exposure while the test is in place.     You can remove the tests only if there is a severe reaction (itch, pain, blistering). Please  report this to your doctor immediately. If you have to remove the tests please tatyana the locations of each test field with a grid so we can identify the allergen.  WHAT ARE THE POSSIBLE RISKS OF PATCH TESTS     If you are allergic to a compound tested you will develop a localized skin reaction similar to your previous reaction, this may take days to develop. These reactions include a formation of red, itchy skin lesions that could be about a centimeter with small vesicles or possibly even blisters. The lesions will fade over time, this may take weeks. The test might leave some skin pigmentation for a few months.     In rare cases a localized reaction to patch testing can become generalized.     The tests with your own products might have some risks because they are not standardized and unanticipated reactions could occur. We need as much information as possible to evaluate if your own products are safe to test and under what conditions. This has to be evaluated for each individual product.   Useful Contact Information    To contact your doctor you can either send a [employer] message or call [phone]     Address  o [address] [place] [address]    If you develop any serious or adverse allergic reaction after office hours please seek immediate medical assistance at the nearest clinic, urgent care, or emergency room.

## 2019-01-09 NOTE — PROGRESS NOTES
[facility] Allergy Note      Allergy Problem List:    Specialty Problems        Dermatology Diagnoses    Dermatitis, seborrheic        Eczema, unspecified type        Neoplasm of uncertain behavior of skin        Pruritus            1. Benign nevi, s/p biopsy, outside facility, ~ 2007  2. Eczematous dermatitis with atopic diathesis  - involving antecubital fossae,vulvar skin, triamcinolone ointment BID to trunk and body   - lab evaluation today (11/8/18): CBC, TSH, iron studies, IgE  - patch test referral  3. Seborrheic dermatitis, fluocinonide solution and ketoconazole shampoo  4. Pigmented macule, NUB, s/p shave biopsy 11/8/18, path pending = dysplastic naevus with mild atypia    CC:   Allergies (Julieta wilkerson for an allergy consult)        Encounter Date: Jan 9, 2019    History of Present Illness:    Ms. Lise Ronquillo is a 38 year old female who presents for skin check and in addition has some itchy spots. Her father has a history of eczema. She is not sure if she has a past history of eczema but has no seasonal allergies and no asthma. Her mom had skin cancer which she believes was NMSC. She feels overall well today.  She states she currently has itchy spots on her skin, marisel itchy external auditory canal and the skin around her left axilla. She will scratch this area on her chest by the axilla constantly.  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 She has tried eczema and psoriasis lotions and coconut oil. No topical steroids. She has similarly itchy vulvar skin and has tried over the counter terbinafine and butenafine topically. She had laser hair removal of under arms and bikini, last treatment 2012 ( 7 total treatments). She is otherwise well today with no additional skin complaints.     Since childhood recurrent eczemas, initially in flexural areas and recently more on hands and axillar and groin area. Axillar lesions exclusively on left  side and left elbow flexural area, groin area both sides and palm of hands right>>left (thenar palm and side of fingers). Both ear canals get very itchy and flaky. Many years as well itchy scalp.  --> with treatment with Triamcinolone on hands and axilla and groins and Fluocinonide sol on scalp and ear canal much less itching (about 3x weekly).    Used in Axilla organic deodorant (Schmetz), shampoo = Heavenly essence shampoo, soap = shea moisture (coconut, Hibiscus, shea butter); collagen body lotion, Emu oil, DM Plus serum    Past Medical History:   Patient Active Problem List   Diagnosis     Anorexia     Eczema, unspecified type     Dermatitis, seborrheic     Pruritus     Neoplasm of uncertain behavior of skin     Past Medical History:   Diagnosis Date     Hypothyroid        Allergy History:     Allergies   Allergen Reactions     No Known Drug Allergies    no Asthma  Sometimes in the morning some stuffy nose, no sneezing and some red eyes (no pets)  Flexural eczema since childhood.    Social History:  The patient works as a nurse in ICU. Patient has the following hobbies or non-occupational exposure: running     reports that  has never smoked. she has never used smokeless tobacco. She reports that she does not drink alcohol or use drugs.      Family History:  Family History   Problem Relation Age of Onset     Skin Cancer Mother      Melanoma No family hx of    father has eczemas    Medications:  Current Outpatient Medications   Medication Sig Dispense Refill     cholecalciferol (VITAMIN D3) 1000 UNIT tablet Take 1,000 Units by mouth       fluocinonide (LIDEX) 0.05 % solution Apply topically 2 times daily 60 mL 3     ibuprofen (ADVIL,MOTRIN) 600 MG tablet Take 1 tablet (600 mg) by mouth every 6 hours as needed for moderate pain (Patient not taking: Reported on 11/8/2018) 30 tablet 1     ibuprofen (ADVIL,MOTRIN) 600 MG tablet Take 1 tablet (600 mg) by mouth every 6 hours as needed for moderate pain (Patient not  taking: Reported on 11/8/2018) 30 tablet 1     ketoconazole (NIZORAL) 2 % shampoo Apply topically daily as needed for itching or irritation 120 mL 11     levothyroxine (SYNTHROID, LEVOTHROID) 175 MCG tablet Take 175 mcg by mouth daily       MAGIC MOUTHWASH, ENTER INGREDIENTS IN COMMENTS, Swish and spit 5-10 mLs in mouth every 6 hours as needed Pharmacy please compound 4 grams of carafate susp in 30 ml of Benadryl (12.5 mg/5 ml), 60 ml Maalox and 30 ml Viscous Lidocaine (Patient not taking: Reported on 11/8/2018) 160 mL 0     triamcinolone (KENALOG) 0.1 % ointment Apply topically 2 times daily 454 g 3           Review of Systems:  -As per HPI  -Constitutional: The patient denies fatigue, fevers, chills, unintended weight loss, and night sweats.  -HEENT: Patient denies nonhealing oral sores.  -Skin: As above in HPI. No additional skin concerns.    Physical exam:  Vitals: There were no vitals taken for this visit.  GEN: This is a well developed, well-nourished female in no acute distress, in a pleasant mood.    SKIN: Waist-up skin, which includes the head/face, neck, both arms, chest, back, abdomen, digits and/or nails was examined.  Now only postinflammatory hyperpigmentation axillar ==> in photo from November 2018 subacute-chronic eczema (rather not fungal).   Inguinal now only hyperpigmentation   Right hand over thenar area some dry, hyperkeratotic, slightly red lesions and dry without much erythema over the sides of fingers    -No other lesions of concern on areas examined.     Allergy Tests:    Past Allergy Test: never done    Current Allergy Test:       Order for PATCH TESTS    [x] Outpatient  [] Inpatient: [place]..../ Bed ....      Skin Atopy (atopic dermatitis) [x] Yes   [] No  Rhinitis/Sinusitis:   [x] Yes   [] No  Allergic Asthma:   [] Yes   [x] No  Food Allergy:   [] Yes   [x] No  Leg ulcers:   [] Yes   [x] No  Hand eczema:   [x] Yes   [] No   Leading hand:   [x] R   [] L       [] Ambidextrous                         Reason for tests (suspected allergy): allergic contact dermatitis to topical cosmetics/soaps/fragrances  Known previous allergies: had some reaction to the central part of fit bit    Standardized panels  [x] Standard panel (40 tests)  [x] Preservatives & Antimicrobials (31 tests)  [x] Emulsifiers & Additives (25 tests)   [x] Perfumes/Flavours & Plants (25 tests)  [] Hairdresser panel (12 tests)  [] Rubber Chemicals (22 tests)  [] Plastics (26 tests)  [] Colorants/Dyes/Food additives (20 tests)  [] Metals (implants/dental) (23 tests)  [] Local anaesthetics/NSAIDs (12 tests)  [] Antibiotics & Antimycotics (14 tests)   [] Corticosteroids (15 tests)   [] Photopatch test (32 tests)   [] others: ...      [x] Patient's own products: Shampoo, body wash and deodorant --> fit bit plastic or metal??    DO NOT test if chemical or biological identity is unknown!     always ask from patient the product information and safety sheets (MSDS)     [] Patient needs consultation with Allergy team (changes of tests may apply)  [x] Tests discussed with Allergy team (can have direct appointment for patch tests)     Maybe later perform prick test atopy screen    Impression/Plan:    1) atopic predisposition with   - atopic dermatitis (flexural) since childhood   - maybe perennial rhinitis (HDM)    2) allergic contact dermatitis (hand, vulva, groins, axilla, scalp, ears) to soaps, disinfectants, fragrances DDx atopic dermatitis      ==> try to use in future Vanicream cream and Free&Clear Shampoo, soap and conditioner  ==> if necessary use corticosteroids (triamcinolone oint and Fluocinonide sol)      Follow-up for patch tests    I spent a total of 25 min face to face with Lise Ronquillo during today's office visit. About 50% of the time was spent counseling the patient and/or coordinating care regarding their allergy.

## 2019-01-11 ENCOUNTER — DOCUMENTATION ONLY (OUTPATIENT)
Dept: DERMATOLOGY | Facility: CLINIC | Age: 39
End: 2019-01-11

## 2019-01-11 NOTE — PROGRESS NOTES
Date/time of application:01/21/19  Physician/Nurse:  / Amada Paniagua LPN               Localization of application: back     STANDARD Series         No Substance 2 days 4 days remarks   1 Isaak Mix [C] - -    2 Colophony - -    3  2-Mercaptobenzothiazole  - -     4 Methylisothiazolinone - -    5 Carba Mix - -    6 Thiuram Mix [A] - -    7 Bisphenol A Epoxy Resin - -    8 F-Uojf-Retwhcefptz-Formaldehyde Resin - -    9 Mercapto Mix [A] - -    10 Black Rubber Mix- PPD [B] - -    11 Potassium Dichromate  -  -    12 Balsam of [place] (Myroxylon Pereirae Resin) - -    13 Nickel Sulphate Hexahydrate - -    14 Mixed Dialkyl Thiourea - -    15 Paraben Mix [B] - -    16 Methyldibromo Glutaronitrile - -    17 Fragrance Mix - -    18 2-Bromo-2-Nitropropane-1,3-Diol (Bronopol) - -    19 Lyral - -    20 Tixocortol-21- Pivalate - -    21 Diazolidiyl Urea (Germall II) - -    22 Methyl Methacrylate - -    23 Cobalt (II) Chloride Hexahydrate - -    24 Fragrance Mix II  - -    25 Compositae Mix - -    26 Benzoyl Peroxide - -    27 Bacitracin - -    28 Formaldehyde - -    29 Methylchloroisothiazolinone / Methylisothiazolinone - -    30 Corticosteroid Mix - -    31 Sodium Lauryl Sulfate - -    32 Lanolin Alcohol - -    33 Turpentine - -    34 Cetylstearylalcohol - -    35 Chlorhexidine Dicluconate - -    36 Budenoside - -    37 Imidazolidinyl Urea  - -    38 Ethyl-2 Cyanoacrylate - -    39 Quaternium 15 (Dowicil 200) - -    40 Decyl Glucoside - -      EMULSIFIERS & ADDITIVES        No Substance 2 days 4 days remarks   41 Polyethylene Glycol-400 - -    42 Cocamidopropyl Betaine - -    43 Amerchol L101 - -    44 Propylene Glycol - -    45 Triethanolamine - -    46 Sorbitane Sesquiolate - -    47 Isopropylmyristate - -    48 Polysorbate 80  - -    49 Amidoamine   (Stearamidopropyl Dimethylamine) - -    50 Oleamidopropyl Dimethylamine - -    51 Lauryl Glucoside - -    52 Coconut Diethanolamide  - -    53 2-Hydroxy-4-Methoxy  Benzophenone (Oxybenzone) - -    54 Benzophenone-4 (Sulisobenzon) - -    55 Propolis - -    56 Dexpanthenol - -    57 Carboxymethyl Cellulose Sodium - -    58 Abitol - -    59 Tert-Butylhydroquinone - -    60 Benzyl Salicylate - -     Antioxidant      61 Dodecyl Gallate - -    62 Butylhydroxyanisole (BHA) - -    63 Butylhydroxytoluene (BHT) - -    64 Di-Alpha-Tocopherol (Vit E) - -    65 Propyl Gallate - -      PERFUMES, FLAVORS & PLANTS         No Substance 2 days 4 days remarks   66 Benzyl Salicylate - -    67 Benzyl Cinnamate - -    68 Di-Limonene (Dipentene) - -    69 Cananga Odorata (Olman Thurman) (I) - -    70 Lichen Acid Mix - -    71 Mentha Piperita Oil (Peppermint Oil) - -    72 Sesquiterpenelactone mix - -    73 Tea Tree Oil, Oxidized - -    74 Wood Tar Mix - -    75 Abietic Acid - -    76 Lavendula Angustifolia Oil (Lavender Oil) - -    77 Camphor  - -     Fragrance Mix I      78 Oakmoss Absolute - -    79 Eugenol - -    80 Geraniol - -    81 Hydroxycitronellal - -    82 Isoeugenol - -    83 Cinnamic Aldehyde - -    84 Cinnamic Alcohol  - -    85 Sorbitane Sesquioleate - -     Fragrance mix II      86 Citronellol - -    87 Alpha-Hexylcinnamic Aldehyde    - -    88 Citral - -    89 Farnesol - -    90 Coumarin - -      PRESERVATIVES & ANTIMICROBIALS         No Substance 2 days 4 days remarks   91  1,2-Benzisothiazoline-3-One, Sodium Salt - -    92  1,3,5-Sebastian (2-Hydroxyethyl) - Hexahydrotriazine (Grotan BK) - -    93 3-Nsqpakhspinaw-2-Nitro-1, 3-Propanediol - -    94  3, 4, 4' - Triclocarban - -    95 4 - Chloro - 3 - Cresol - -    96 4 - Chloro - 4 - Xylenol (PCMX) - -    97 7-Ethylbicyclooxazolidine (Bioban HY9136) - -    98 Benzalkonium Chloride - -    99 Benzyl Alcohol - -    100 Cetalkonium Chloride - -    101 Cetylpyrimidine Chloride  - -    102 Chloroacetamide - -    103 DMDM Hydantoin - -    104 Glutaraldehyde - -    105 Triclosan - -    106 Glyoxal Trimeric Dihydrate - -    107 Iodopropynyl  "Butylcarbamate - -    108 Octylisothiazoline - -    109 Iodoform - -    110 (Nitrobutyl) Morpholine/(Ethylnitro-Trimethylene) Dimorpholine (Bioban P 1487) - -    111 Phenoxyethanol - -    112 Phenyl Salicylate - -    113 Povidone Iodine - -    114 Sodium Benzoate - -    115 Sodium Disulfite - -    116 Sorbic Acid - -    117 Thimerosal - -     Parabens      118 Butyl-P-Hydroxybenzoate - -    119 Ethyl-P-Hydroxybenzoate - -    120 Methyl-P-Hydroxybenzoate - -    121 Propyl-P-Hydroxybenzoate - -      PATIENTS OWN PRODUCTS         No Substance Conc  % solv 2 days 4 days remarks   122         123         124         125         126         127         128         129         130         131           Patients own products:  è DO NOT test if chemical or biological identity is unknown!   - always ask from patient the product information and safety sheets and consult with the physician   - check neutral pH with pH indicator paper (do not test pH below 5 or over 8 or consult with physician)  - leave-on cosmetics can be tested \"as is\"  - rinse-off products have to be diluted with water, buffer, olive oil or paraffin (discuss with physician)  à remember:   - non-specific toxic/corrosive or biological reactions can occur  - tests with patients own products are not standardized and test conditions are not optimized for patch tests. Whenever possible test with standardized test series and correlate use of product with the result of the patch tests  - if not sure if compounds can be tested under occlusion in patch tests consider open application tests        Results of patch tests:                         Interpretation:    - Negative                    A    = Allergic      (+) Erythema    TI   = Toxic/irritant   + E + Infiltration    RaP = Relevance at Present     ++ E/I + Papulovesicle   Rpr  = Relevance Previously     +++ E/I/P + Blister     nR   = No Relevance        "

## 2019-01-11 NOTE — LETTER
January 11, 2019      Lise Ronquillo  [address]  [place] [address]              Dear Insurance Carrier,      One of your enrolled members, Lise Ronquillo , has been referred by Dr.Paul Awad, who is a dermatoallergist, for Comprehensive Patch Testing. Comprehensive patch testing is medically   necessary for this patient.    COMPREHENSIVE PATCH TESTING IS INDICATED FOR PATIENTS WHO HAVE  CHRONIC DERMATITIS THAT HAS NOT IMPROVED AFTER RECEIVING  AGGRESSIVE TREATMENT BY DERMATOLOGISTS AND/OR ALLERGISTS  AND WHICH SEVERELY IMPACTS THE INDIVIDUAL S HEALTH AND  QUALITY OF LIFE.    This patient meets the above criteria and thus requires comprehensive patch testing. This  is likely to entail more than 80 units of CPT code 27719. In addition, Dr. Awad will spend  a substantial amount of time working to determine the cause of the dermatitis and then is  able to more precisely develop a personalized treatment plan.    This Comprehensive Patch Testing cannot be performed by most allergists or  dermatologists, who are only able to perform limited patch testing, which is inadequate or  inappropriate for the diagnosis of possible contact allergy.    Please see the following document for additional information and many peer reviewed  references on the medical necessity of Comprehensive Patch Testing.

## 2019-01-21 ENCOUNTER — OFFICE VISIT (OUTPATIENT)
Dept: DERMATOLOGY | Facility: CLINIC | Age: 39
End: 2019-01-21
Payer: COMMERCIAL

## 2019-01-21 DIAGNOSIS — E05.90 HYPERTHYROIDISM: Primary | ICD-10-CM

## 2019-01-21 ASSESSMENT — PAIN SCALES - GENERAL: PAINLEVEL: NO PAIN (0)

## 2019-01-21 NOTE — NURSING NOTE
Rooming Note    Lise Ronquillo's goals for this visit include:   Chief Complaint   Patient presents with     Allergies     Lise is here for patch testing day 1     Amada Paniagua LPN

## 2019-01-21 NOTE — PROGRESS NOTES
[facility] Allergy Note      Allergy Problem List:    Specialty Problems        Dermatology Diagnoses    Dermatitis, seborrheic        Eczema, unspecified type        Neoplasm of uncertain behavior of skin        Pruritus            1. Benign nevi, s/p biopsy, outside facility, ~ 2007  2. Eczematous dermatitis with atopic diathesis  - involving antecubital fossae,vulvar skin, triamcinolone ointment BID to trunk and body   - lab evaluation today (11/8/18): CBC, TSH, iron studies, IgE  - patch test referral  3. Seborrheic dermatitis, fluocinonide solution and ketoconazole shampoo  4. Pigmented macule, NUB, s/p shave biopsy 11/8/18, path pending = dysplastic naevus with mild atypia    CC:   Allergies (Lise is here for patch testing day 1)    Encounter Date: Jan 21, 2019    History of Present Illness:    Ms. Lise Ronquillo is a 38 year old female who presents for skin check and in addition has some itchy spots. Her father has a history of eczema. She is not sure if she has a past history of eczema but has no seasonal allergies and no asthma. Her mom had skin cancer which she believes was NMSC. She feels overall well today.  She states she currently has itchy spots on her skin, marisel itchy external auditory canal and the skin around her left axilla. She will scratch this area on her chest by the axilla constantly.  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 She has tried eczema and psoriasis lotions and coconut oil. No topical steroids. She has similarly itchy vulvar skin and has tried over the counter terbinafine and butenafine topically. She had laser hair removal of under arms and bikini, last treatment 2012 ( 7 total treatments). She is otherwise well today with no additional skin complaints.     Since childhood recurrent eczemas, initially in flexural areas and recently more on hands and axillar and groin area. Axillar lesions exclusively on left  side and left elbow flexural area, groin area both sides and palm of hands right>>left (thenar palm and side of fingers). Both ear canals get very itchy and flaky. Many years as well itchy scalp.  --> with treatment with Triamcinolone on hands and axilla and groins and Fluocinonide sol on scalp and ear canal much less itching (about 3x weekly).    Used in Axilla organic deodorant (Schmetz), shampoo = Heavenly essence shampoo, soap = shea moisture (coconut, Hibiscus, shea butter); collagen body lotion, Emu oil, DM Plus serum    Past Medical History:   Patient Active Problem List   Diagnosis     Anorexia     Eczema, unspecified type     Dermatitis, seborrheic     Pruritus     Neoplasm of uncertain behavior of skin     Past Medical History:   Diagnosis Date     Hypothyroid        Allergy History:     Allergies   Allergen Reactions     No Known Drug Allergies    no Asthma  Sometimes in the morning some stuffy nose, no sneezing and some red eyes (no pets)  Flexural eczema since childhood.    Social History:  The patient works as a nurse in ICU. Patient has the following hobbies or non-occupational exposure: running     reports that  has never smoked. she has never used smokeless tobacco. She reports that she does not drink alcohol or use drugs.      Family History:  Family History   Problem Relation Age of Onset     Skin Cancer Mother      Melanoma No family hx of    father has eczemas    Medications:  Current Outpatient Medications   Medication Sig Dispense Refill     cholecalciferol (VITAMIN D3) 1000 UNIT tablet Take 1,000 Units by mouth       fluocinonide (LIDEX) 0.05 % solution Apply topically 2 times daily 60 mL 3     ibuprofen (ADVIL,MOTRIN) 600 MG tablet Take 1 tablet (600 mg) by mouth every 6 hours as needed for moderate pain (Patient not taking: Reported on 11/8/2018) 30 tablet 1     ibuprofen (ADVIL,MOTRIN) 600 MG tablet Take 1 tablet (600 mg) by mouth every 6 hours as needed for moderate pain (Patient not  taking: Reported on 11/8/2018) 30 tablet 1     ketoconazole (NIZORAL) 2 % shampoo Apply topically daily as needed for itching or irritation 120 mL 11     levothyroxine (SYNTHROID, LEVOTHROID) 175 MCG tablet Take 175 mcg by mouth daily       MAGIC MOUTHWASH, ENTER INGREDIENTS IN COMMENTS, Swish and spit 5-10 mLs in mouth every 6 hours as needed Pharmacy please compound 4 grams of carafate susp in 30 ml of Benadryl (12.5 mg/5 ml), 60 ml Maalox and 30 ml Viscous Lidocaine (Patient not taking: Reported on 11/8/2018) 160 mL 0     triamcinolone (KENALOG) 0.1 % ointment Apply topically 2 times daily 454 g 3           Review of Systems:  -As per HPI  -Constitutional: The patient denies fatigue, fevers, chills, unintended weight loss, and night sweats.  -HEENT: Patient denies nonhealing oral sores.  -Skin: As above in HPI. No additional skin concerns.    Physical exam:  Vitals: There were no vitals taken for this visit.  GEN: This is a well developed, well-nourished female in no acute distress, in a pleasant mood.    SKIN: Waist-up skin, which includes the head/face, neck, both arms, chest, back, abdomen, digits and/or nails was examined.  Now only postinflammatory hyperpigmentation axillar ==> in photo from November 2018 subacute-chronic eczema (rather not fungal).   Inguinal now only hyperpigmentation   Right hand over thenar area some dry, hyperkeratotic, slightly red lesions and dry without much erythema over the sides of fingers    -No other lesions of concern on areas examined.     Allergy Tests:    Past Allergy Test: never done    Current Allergy Test:       Order for PATCH TESTS    [x] Outpatient  [] Inpatient: [place]..../ Bed ....      Skin Atopy (atopic dermatitis) [x] Yes   [] No  Rhinitis/Sinusitis:   [x] Yes   [] No  Allergic Asthma:   [] Yes   [x] No  Food Allergy:   [] Yes   [x] No  Leg ulcers:   [] Yes   [x] No  Hand eczema:   [x] Yes   [] No   Leading hand:   [x] R   [] L       [] Ambidextrous                         Reason for tests (suspected allergy): allergic contact dermatitis to topical cosmetics/soaps/fragrances  Known previous allergies: had some reaction to the central part of fit bit    Standardized panels  [x] Standard panel (40 tests)  [x] Preservatives & Antimicrobials (31 tests)  [x] Emulsifiers & Additives (25 tests)   [x] Perfumes/Flavours & Plants (25 tests)  [] Hairdresser panel (12 tests)  [] Rubber Chemicals (22 tests)  [] Plastics (26 tests)  [] Colorants/Dyes/Food additives (20 tests)  [] Metals (implants/dental) (23 tests)  [] Local anaesthetics/NSAIDs (12 tests)  [] Antibiotics & Antimycotics (14 tests)   [] Corticosteroids (15 tests)   [] Photopatch test (32 tests)   [] others: ...      [x] Patient's own products: Shampoo, body wash and deodorant --> fit bit plastic or metal??    DO NOT test if chemical or biological identity is unknown!     always ask from patient the product information and safety sheets (MSDS)     [] Patient needs consultation with Allergy team (changes of tests may apply)  [x] Tests discussed with Allergy team (can have direct appointment for patch tests)       RESUL TS & EVALUATION of PATCH TESTS    Patch test readings after     [x] 2 days, [] 3 days [x] 4 days, [] 5 days,    Applied patch tests with results (import here the list of patch tests):  Date/time of application:01/21/19  Physician/Nurse:  / Amada Paniagua LPN               Localization of application: back (no lesions visible)     STANDARD Series         No Substance 2 days 4 days remarks   1 Isaak Mix [C] - -    2 Colophony - -    3  2-Mercaptobenzothiazole  - -     4 Methylisothiazolinone - -    5 Carba Mix - -    6 Thiuram Mix [A] - -    7 Bisphenol A Epoxy Resin - -    8 S-Pjix-Djpeuoonvja-Formaldehyde Resin - -    9 Mercapto Mix [A] - -    10 Black Rubber Mix- PPD [B] - -    11 Potassium Dichromate  -  -    12 Balsam of [place] (Myroxylon Pereirae Resin) - -    13 Nickel Sulphate Hexahydrate - -     14 Mixed Dialkyl Thiourea - -    15 Paraben Mix [B] - -    16 Methyldibromo Glutaronitrile - -    17 Fragrance Mix - -    18 2-Bromo-2-Nitropropane-1,3-Diol (Bronopol) - -    19 Lyral - -    20 Tixocortol-21- Pivalate - -    21 Diazolidiyl Urea (Germall II) - -    22 Methyl Methacrylate - -    23 Cobalt (II) Chloride Hexahydrate - -    24 Fragrance Mix II  - -    25 Compositae Mix - -    26 Benzoyl Peroxide - -    27 Bacitracin - -    28 Formaldehyde - -    29 Methylchloroisothiazolinone / Methylisothiazolinone - -    30 Corticosteroid Mix - -    31 Sodium Lauryl Sulfate - -    32 Lanolin Alcohol - -    33 Turpentine - -    34 Cetylstearylalcohol - -    35 Chlorhexidine Dicluconate - -    36 Budenoside - -    37 Imidazolidinyl Urea  - -    38 Ethyl-2 Cyanoacrylate - -    39 Quaternium 15 (Dowicil 200) - -    40 Decyl Glucoside - -      EMULSIFIERS & ADDITIVES        No Substance 2 days 4 days remarks   41 Polyethylene Glycol-400 - -    42 Cocamidopropyl Betaine - -    43 Amerchol L101 - -    44 Propylene Glycol - -    45 Triethanolamine - -    46 Sorbitane Sesquiolate - -    47 Isopropylmyristate - -    48 Polysorbate 80  - -    49 Amidoamine   (Stearamidopropyl Dimethylamine) - -    50 Oleamidopropyl Dimethylamine - -    51 Lauryl Glucoside - -    52 Coconut Diethanolamide  - -    53 2-Hydroxy-4-Methoxy Benzophenone (Oxybenzone) - -    54 Benzophenone-4 (Sulisobenzon) - -    55 Propolis - -    56 Dexpanthenol - -    57 Carboxymethyl Cellulose Sodium - -    58 Abitol - -    59 Tert-Butylhydroquinone - -    60 Benzyl Salicylate - -     Antioxidant      61 Dodecyl Gallate - -    62 Butylhydroxyanisole (BHA) - -    63 Butylhydroxytoluene (BHT) - -    64 Di-Alpha-Tocopherol (Vit E) - -    65 Propyl Gallate - -      PERFUMES, FLAVORS & PLANTS         No Substance 2 days 4 days remarks   66 Benzyl Salicylate - -    67 Benzyl Cinnamate - -    68 Di-Limonene (Dipentene) - -    69 Cananga Odorata (Olman Thurman) (I) - -     70 Lichen Acid Mix - -    71 Mentha Piperita Oil (Peppermint Oil) - -    72 Sesquiterpenelactone mix - -    73 Tea Tree Oil, Oxidized - -    74 Wood Tar Mix - -    75 Abietic Acid - -    76 Lavendula Angustifolia Oil (Lavender Oil) - -    77 Camphor  - -     Fragrance Mix I      78 Oakmoss Absolute - -    79 Eugenol - -    80 Geraniol - -    81 Hydroxycitronellal - -    82 Isoeugenol - -    83 Cinnamic Aldehyde - -    84 Cinnamic Alcohol  - -    85 Sorbitane Sesquioleate - -     Fragrance mix II      86 Citronellol - -    87 Alpha-Hexylcinnamic Aldehyde    - -    88 Citral - -    89 Farnesol - -    90 Coumarin - -      PRESERVATIVES & ANTIMICROBIALS         No Substance 2 days 4 days remarks   91  1,2-Benzisothiazoline-3-One, Sodium Salt - -    92  1,3,5-Sebastian (2-Hydroxyethyl) - Hexahydrotriazine (Grotan BK) - -    93 0-Vyxcrjwrijxoy-3-Nitro-1, 3-Propanediol - -    94  3, 4, 4' - Triclocarban - -    95 4 - Chloro - 3 - Cresol - -    96 4 - Chloro - 4 - Xylenol (PCMX) - -    97 7-Ethylbicyclooxazolidine ([employer]an [id number]) - -    98 Benzalkonium Chloride - -    99 Benzyl Alcohol - -    100 Cetalkonium Chloride - -    101 Cetylpyrimidine Chloride  - -    102 Chloroacetamide - -    103 DMDM Hydantoin - -    104 Glutaraldehyde - -    105 Triclosan - -    106 Glyoxal Trimeric Dihydrate - -    107 Iodopropynyl Butylcarbamate - -    108 Octylisothiazoline - -    109 Iodoform - -    110 (Nitrobutyl) Morpholine/(Ethylnitro-Trimethylene) Dimorpholine (Bioban P 1487) - -    111 Phenoxyethanol - -    112 Phenyl Salicylate - -    113 Povidone Iodine - -    114 Sodium Benzoate - -    115 Sodium Disulfite - -    116 Sorbic Acid - -    117 Thimerosal - -     Parabens      118 Butyl-P-Hydroxybenzoate - -    119 Ethyl-P-Hydroxybenzoate - -    120 Methyl-P-Hydroxybenzoate - -    121 Propyl-P-Hydroxybenzoate - -      PATIENTS OWN PRODUCTS         No Substance Conc  % solv 2 days 4 days remarks   122 Moisturizing lotion  Tropical  "traditions        123 Charcoal and magnesium deodorant        124         125         126         127         128         129         130         131           Patients own products:  è DO NOT test if chemical or biological identity is unknown!   - always ask from patient the product information and safety sheets and consult with the physician   - check neutral pH with pH indicator paper (do not test pH below 5 or over 8 or consult with physician)  - leave-on cosmetics can be tested \"as is\"  - rinse-off products have to be diluted with water, buffer, olive oil or paraffin (discuss with physician)  à remember:   - non-specific toxic/corrosive or biological reactions can occur  - tests with patients own products are not standardized and test conditions are not optimized for patch tests. Whenever possible test with standardized test series and correlate use of product with the result of the patch tests  - if not sure if compounds can be tested under occlusion in patch tests consider open application tests        Results of patch tests:                         Interpretation:    - Negative                    A    = Allergic      (+) Erythema    TI   = Toxic/irritant   + E + Infiltration    RaP = Relevance at Present     ++ E/I + Papulovesicle   Rpr  = Relevance Previously     +++ E/I/P + Blister     nR   = No Relevance    [] No relevant allergic reaction observed    [] Allergic reaction diagnosed against: see later      Interpretation/ remarks:   See later    [] Patient information given   [] [facility] information   [] [employer] information    ==> final Diagnosis:    1) atopic predisposition with   - atopic dermatitis (flexural) since childhood   - maybe perennial rhinitis (HDM)    2) allergic contact dermatitis (hand, vulva, groins, axilla, scalp, ears) to soaps, disinfectants, fragrances DDx atopic dermatitis      ==> Treatment prescribed/Plan:  > try to use in future Vanicream cream and Free&Clear Shampoo, soap and " conditioner  > if necessary use corticosteroids (triamcinolone oint and Fluocinonide sol)       These conclusions are made at the best of ones knowledge and belief  based on the provided evidence such as patients history and allergy test results and they can change over time or can be incomplete because of missing informations.        Maybe later perform prick test atopy screen      Staff Physician Comments:  I saw and evaluated the patient with the resident and I agree with the assessment and plan as documented in the resident's note.    Tan Awad MD  Professor  Head of Dermato-Allergy Division  Department of Dermatology  [facility]

## 2019-01-21 NOTE — LETTER
1/21/2019       RE: Lise Ronquillo  [address]  [place] [address]     Dear Colleague,    Thank you for referring your patient, Lise Ronquillo, to the [facility] DERMATOLOGY at [facility]. Please see a copy of my visit note below.        Again, [facility] Allergy Note      Allergy Problem List:    Specialty Problems        Dermatology Diagnoses    Dermatitis, seborrheic        Eczema, unspecified type        Neoplasm of uncertain behavior of skin        Pruritus            1. Benign nevi, s/p biopsy, outside facility, ~ 2007  2. Eczematous dermatitis with atopic diathesis  - involving antecubital fossae,vulvar skin, triamcinolone ointment BID to trunk and body   - lab evaluation today (11/8/18): CBC, TSH, iron studies, IgE  - patch test referral  3. Seborrheic dermatitis, fluocinonide solution and ketoconazole shampoo  4. Pigmented macule, NUB, s/p shave biopsy 11/8/18, path pending = dysplastic naevus with mild atypia    CC:   Allergies (Lise is here for patch testing day 1)    Encounter Date: Jan 21, 2019    History of Present Illness:    Ms. Lise Ronquillo is a 38 year old female who presents for skin check and in addition has some itchy spots. Her father has a history of eczema. She is not sure if she has a past history of eczema but has no seasonal allergies and no asthma. Her mom had skin cancer which she believes was NMSC. She feels overall well today.  She states she currently has itchy spots on her skin, marisel itchy external auditory canal and the skin around her left axilla. She will scratch this area on her chest by the axilla constantly.  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 She has tried eczema and psoriasis lotions and coconut oil. No topical steroids. She has similarly itchy vulvar skin and has tried over the counter terbinafine and butenafine topically. She had  laser hair removal of under arms and bikini, last treatment 2012 ( 7 total treatments). She is otherwise well today with no additional skin complaints.     Since childhood recurrent eczemas, initially in flexural areas and recently more on hands and axillar and groin area. Axillar lesions exclusively on left side and left elbow flexural area, groin area both sides and palm of hands right>>left (thenar palm and side of fingers). Both ear canals get very itchy and flaky. Many years as well itchy scalp.  --> with treatment with Triamcinolone on hands and axilla and groins and Fluocinonide sol on scalp and ear canal much less itching (about 3x weekly).    Used in Axilla organic deodorant (Schmetz), shampoo = Heavenly essence shampoo, soap = shea moisture (coconut, Hibiscus, shea butter); collagen body lotion, Emu oil, DM Plus serum    Past Medical History:   Patient Active Problem List   Diagnosis     Anorexia     Eczema, unspecified type     Dermatitis, seborrheic     Pruritus     Neoplasm of uncertain behavior of skin     Past Medical History:   Diagnosis Date     Hypothyroid        Allergy History:     Allergies   Allergen Reactions     No Known Drug Allergies    no Asthma  Sometimes in the morning some stuffy nose, no sneezing and some red eyes (no pets)  Flexural eczema since childhood.    Social History:  The patient works as a nurse in ICU. Patient has the following hobbies or non-occupational exposure: running     reports that  has never smoked. she has never used smokeless tobacco. She reports that she does not drink alcohol or use drugs.      Family History:  Family History   Problem Relation Age of Onset     Skin Cancer Mother      Melanoma No family hx of    father has eczemas    Medications:  Current Outpatient Medications   Medication Sig Dispense Refill     cholecalciferol (VITAMIN D3) 1000 UNIT tablet Take 1,000 Units by mouth       fluocinonide (LIDEX) 0.05 % solution Apply topically 2 times daily 60 mL  3     ibuprofen (ADVIL,MOTRIN) 600 MG tablet Take 1 tablet (600 mg) by mouth every 6 hours as needed for moderate pain (Patient not taking: Reported on 11/8/2018) 30 tablet 1     ibuprofen (ADVIL,MOTRIN) 600 MG tablet Take 1 tablet (600 mg) by mouth every 6 hours as needed for moderate pain (Patient not taking: Reported on 11/8/2018) 30 tablet 1     ketoconazole (NIZORAL) 2 % shampoo Apply topically daily as needed for itching or irritation 120 mL 11     levothyroxine (SYNTHROID, LEVOTHROID) 175 MCG tablet Take 175 mcg by mouth daily       MAGIC MOUTHWASH, ENTER INGREDIENTS IN COMMENTS, Swish and spit 5-10 mLs in mouth every 6 hours as needed Pharmacy please compound 4 grams of carafate susp in 30 ml of Benadryl (12.5 mg/5 ml), 60 ml Maalox and 30 ml Viscous Lidocaine (Patient not taking: Reported on 11/8/2018) 160 mL 0     triamcinolone (KENALOG) 0.1 % ointment Apply topically 2 times daily 454 g 3           Review of Systems:  -As per HPI  -Constitutional: The patient denies fatigue, fevers, chills, unintended weight loss, and night sweats.  -HEENT: Patient denies nonhealing oral sores.  -Skin: As above in HPI. No additional skin concerns.    Physical exam:  Vitals: There were no vitals taken for this visit.  GEN: This is a well developed, well-nourished female in no acute distress, in a pleasant mood.    SKIN: Waist-up skin, which includes the head/face, neck, both arms, chest, back, abdomen, digits and/or nails was examined.  Now only postinflammatory hyperpigmentation axillar ==> in photo from November 2018 subacute-chronic eczema (rather not fungal).   Inguinal now only hyperpigmentation   Right hand over thenar area some dry, hyperkeratotic, slightly red lesions and dry without much erythema over the sides of fingers    -No other lesions of concern on areas examined.     Allergy Tests:    Past Allergy Test: never done    Current Allergy Test:       Order for PATCH TESTS    [x] Outpatient  [] Inpatient:  [place]..../ Bed ....      Skin Atopy (atopic dermatitis) [x] Yes   [] No  Rhinitis/Sinusitis:   [x] Yes   [] No  Allergic Asthma:   [] Yes   [x] No  Food Allergy:   [] Yes   [x] No  Leg ulcers:   [] Yes   [x] No  Hand eczema:   [x] Yes   [] No   Leading hand:   [x] R   [] L       [] Ambidextrous                        Reason for tests (suspected allergy): allergic contact dermatitis to topical cosmetics/soaps/fragrances  Known previous allergies: had some reaction to the central part of fit bit    Standardized panels  [x] Standard panel (40 tests)  [x] Preservatives & Antimicrobials (31 tests)  [x] Emulsifiers & Additives (25 tests)   [x] Perfumes/Flavours & Plants (25 tests)  [] Hairdresser panel (12 tests)  [] Rubber Chemicals (22 tests)  [] Plastics (26 tests)  [] Colorants/Dyes/Food additives (20 tests)  [] Metals (implants/dental) (23 tests)  [] Local anaesthetics/NSAIDs (12 tests)  [] Antibiotics & Antimycotics (14 tests)   [] Corticosteroids (15 tests)   [] Photopatch test (32 tests)   [] others: ...      [x] Patient's own products: Shampoo, body wash and deodorant --> fit bit plastic or metal??    DO NOT test if chemical or biological identity is unknown!     always ask from patient the product information and safety sheets (MSDS)     [] Patient needs consultation with Allergy team (changes of tests may apply)  [x] Tests discussed with Allergy team (can have direct appointment for patch tests)       RESUL TS & EVALUATION of PATCH TESTS    Patch test readings after     [x] 2 days, [] 3 days [x] 4 days, [] 5 days,    Applied patch tests with results (import here the list of patch tests):  Date/time of application:01/21/19  Physician/Nurse:  / Amada Paniagua LPN               Localization of application: back (no lesions visible)     STANDARD Series         No Substance 2 days 4 days remarks   1 Isaak Mix [C] - -    2 Colophony - -    3  2-Mercaptobenzothiazole  - -     4 Methylisothiazolinone - -     5 Carba Mix - -    6 Thiuram Mix [A] - -    7 Bisphenol A Epoxy Resin - -    8 O-Oifn-Vgifamvuwbk-Formaldehyde Resin - -    9 Mercapto Mix [A] - -    10 Black Rubber Mix- PPD [B] - -    11 Potassium Dichromate  -  -    12 Balsam of [place] (Myroxylon Pereirae Resin) - -    13 Nickel Sulphate Hexahydrate - -    14 Mixed Dialkyl Thiourea - -    15 Paraben Mix [B] - -    16 Methyldibromo Glutaronitrile - -    17 Fragrance Mix - -    18 2-Bromo-2-Nitropropane-1,3-Diol (Bronopol) - -    19 Lyral - -    20 Tixocortol-21- Pivalate - -    21 Diazolidiyl Urea (Germall II) - -    22 Methyl Methacrylate - -    23 Cobalt (II) Chloride Hexahydrate - -    24 Fragrance Mix II  - -    25 Compositae Mix - -    26 Benzoyl Peroxide - -    27 Bacitracin - -    28 Formaldehyde - -    29 Methylchloroisothiazolinone / Methylisothiazolinone - -    30 Corticosteroid Mix - -    31 Sodium Lauryl Sulfate - -    32 Lanolin Alcohol - -    33 Turpentine - -    34 Cetylstearylalcohol - -    35 Chlorhexidine Dicluconate - -    36 Budenoside - -    37 Imidazolidinyl Urea  - -    38 Ethyl-2 Cyanoacrylate - -    39 Quaternium 15 (Dowicil 200) - -    40 Decyl Glucoside - -      EMULSIFIERS & ADDITIVES        No Substance 2 days 4 days remarks   41 Polyethylene Glycol-400 - -    42 Cocamidopropyl Betaine - -    43 Amerchol L101 - -    44 Propylene Glycol - -    45 Triethanolamine - -    46 Sorbitane Sesquiolate - -    47 Isopropylmyristate - -    48 Polysorbate 80  - -    49 Amidoamine   (Stearamidopropyl Dimethylamine) - -    50 Oleamidopropyl Dimethylamine - -    51 Lauryl Glucoside - -    52 Coconut Diethanolamide  - -    53 2-Hydroxy-4-Methoxy Benzophenone (Oxybenzone) - -    54 Benzophenone-4 (Sulisobenzon) - -    55 Propolis - -    56 Dexpanthenol - -    57 Carboxymethyl Cellulose Sodium - -    58 Abitol - -    59 Tert-Butylhydroquinone - -    60 Benzyl Salicylate - -     Antioxidant      61 Dodecyl Gallate - -    62 Butylhydroxyanisole (BHA) - -     63 Butylhydroxytoluene (BHT) - -    64 Di-Alpha-Tocopherol (Vit E) - -    65 Propyl Gallate - -      PERFUMES, FLAVORS & PLANTS         No Substance 2 days 4 days remarks   66 Benzyl Salicylate - -    67 Benzyl Cinnamate - -    68 Di-Limonene (Dipentene) - -    69 Cananga Odorata (Olman Thurman) (I) - -    70 Lichen Acid Mix - -    71 Mentha Piperita Oil (Peppermint Oil) - -    72 Sesquiterpenelactone mix - -    73 Tea Tree Oil, Oxidized - -    74 Wood Tar Mix - -    75 Abietic Acid - -    76 Lavendula Angustifolia Oil (Lavender Oil) - -    77 Camphor  - -     Fragrance Mix I      78 Oakmoss Absolute - -    79 Eugenol - -    80 Geraniol - -    81 Hydroxycitronellal - -    82 Isoeugenol - -    83 Cinnamic Aldehyde - -    84 Cinnamic Alcohol  - -    85 Sorbitane Sesquioleate - -     Fragrance mix II      86 Citronellol - -    87 Alpha-Hexylcinnamic Aldehyde    - -    88 Citral - -    89 Farnesol - -    90 Coumarin - -      PRESERVATIVES & ANTIMICROBIALS         No Substance 2 days 4 days remarks   91  1,2-Benzisothiazoline-3-One, Sodium Salt - -    92  1,3,5-Sebastian (2-Hydroxyethyl) - Hexahydrotriazine (Grotan BK) - -    93 5-Iryofhnwfmhkc-8-Nitro-1, 3-Propanediol - -    94  3, 4, 4' - Triclocarban - -    95 4 - Chloro - 3 - Cresol - -    96 4 - Chloro - 4 - Xylenol (PCMX) - -    97 7-Ethylbicyclooxazolidine (Bioban [id number]) - -    98 Benzalkonium Chloride - -    99 Benzyl Alcohol - -    100 Cetalkonium Chloride - -    101 Cetylpyrimidine Chloride  - -    102 Chloroacetamide - -    103 DMDM Hydantoin - -    104 Glutaraldehyde - -    105 Triclosan - -    106 Glyoxal Trimeric Dihydrate - -    107 Iodopropynyl Butylcarbamate - -    108 Octylisothiazoline - -    109 Iodoform - -    110 (Nitrobutyl) Morpholine/(Ethylnitro-Trimethylene) Dimorpholine (Bioban P 1487) - -    111 Phenoxyethanol - -    112 Phenyl Salicylate - -    113 Povidone Iodine - -    114 Sodium Benzoate - -    115 Sodium Disulfite - -    116 Sorbic Acid - -   "  117 Thimerosal - -     Parabens      118 Butyl-P-Hydroxybenzoate - -    119 Ethyl-P-Hydroxybenzoate - -    120 Methyl-P-Hydroxybenzoate - -    121 Propyl-P-Hydroxybenzoate - -      PATIENTS OWN PRODUCTS         No Substance Conc  % solv 2 days 4 days remarks   122 Moisturizing lotion  Tropical traditions        123 Charcoal and magnesium deodorant        124         125         126         127         128         129         130         131           Patients own products:  è DO NOT test if chemical or biological identity is unknown!   - always ask from patient the product information and safety sheets and consult with the physician   - check neutral pH with pH indicator paper (do not test pH below 5 or over 8 or consult with physician)  - leave-on cosmetics can be tested \"as is\"  - rinse-off products have to be diluted with water, buffer, olive oil or paraffin (discuss with physician)  à remember:   - non-specific toxic/corrosive or biological reactions can occur  - tests with patients own products are not standardized and test conditions are not optimized for patch tests. Whenever possible test with standardized test series and correlate use of product with the result of the patch tests  - if not sure if compounds can be tested under occlusion in patch tests consider open application tests        Results of patch tests:                         Interpretation:    - Negative                    A    = Allergic      (+) Erythema    TI   = Toxic/irritant   + E + Infiltration    RaP = Relevance at Present     ++ E/I + Papulovesicle   Rpr  = Relevance Previously     +++ E/I/P + Blister     nR   = No Relevance    [] No relevant allergic reaction observed    [] Allergic reaction diagnosed against: see later      Interpretation/ remarks:   See later    [] Patient information given   [] ACSD information   [] [employer] information    ==> final Diagnosis:    1) atopic predisposition with   - atopic dermatitis (flexural) " since childhood   - maybe perennial rhinitis (HDM)    2) allergic contact dermatitis (hand, vulva, groins, axilla, scalp, ears) to soaps, disinfectants, fragrances DDx atopic dermatitis      ==> Treatment prescribed/Plan:  > try to use in future Vanicream cream and Free&Clear Shampoo, soap and conditioner  > if necessary use corticosteroids (triamcinolone oint and Fluocinonide sol)       These conclusions are made at the best of ones knowledge and belief  based on the provided evidence such as patients history and allergy test results and they can change over time or can be incomplete because of missing informations.      Maybe later perform prick test atopy screen      Staff Physician Comments:  I saw and evaluated the patient with the resident and I agree with the assessment and plan as documented in the resident's note.    Tan Awad MD  Professor  Head of Dermato-Allergy Division  Department of Dermatology  [facility]

## 2019-01-23 ENCOUNTER — OFFICE VISIT (OUTPATIENT)
Dept: DERMATOLOGY | Facility: CLINIC | Age: 39
End: 2019-01-23
Payer: COMMERCIAL

## 2019-01-23 DIAGNOSIS — L23.89 ALLERGIC CONTACT DERMATITIS DUE TO OTHER AGENTS: Primary | ICD-10-CM

## 2019-01-23 DIAGNOSIS — L20.89 FLEXURAL ATOPIC DERMATITIS: ICD-10-CM

## 2019-01-23 ASSESSMENT — PAIN SCALES - GENERAL: PAINLEVEL: NO PAIN (0)

## 2019-01-23 NOTE — PROGRESS NOTES
[facility] Allergy Note      Allergy Problem List:    Specialty Problems        Dermatology Diagnoses    Dermatitis, seborrheic        Eczema, unspecified type        Neoplasm of uncertain behavior of skin        Pruritus            1. Benign nevi, s/p biopsy, outside facility, ~ 2007  2. Eczematous dermatitis with atopic diathesis  - involving antecubital fossae,vulvar skin, triamcinolone ointment BID to trunk and body   - lab evaluation today (11/8/18): CBC, TSH, iron studies, IgE  - patch test referral  3. Seborrheic dermatitis, fluocinonide solution and ketoconazole shampoo  4. Pigmented macule, NUB, s/p shave biopsy 11/8/18, path pending = dysplastic naevus with mild atypia    CC:   Allergies (Lise is here for allergy testing day 3)    Encounter Date: Jan 23, 2019    History of Present Illness:    Ms. Lise Ronquillo is a 38 year old female who presents for skin check and in addition has some itchy spots. Her father has a history of eczema. She is not sure if she has a past history of eczema but has no seasonal allergies and no asthma. Her mom had skin cancer which she believes was NMSC. She feels overall well today.  She states she currently has itchy spots on her skin, marisel itchy external auditory canal and the skin around her left axilla. She will scratch this area on her chest by the axilla constantly.  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 She has tried eczema and psoriasis lotions and coconut oil. No topical steroids. She has similarly itchy vulvar skin and has tried over the counter terbinafine and butenafine topically. She had laser hair removal of under arms and bikini, last treatment 2012 ( 7 total treatments). She is otherwise well today with no additional skin complaints.     Since childhood recurrent eczemas, initially in flexural areas and recently more on hands and axillar and groin area. Axillar lesions exclusively on  left side and left elbow flexural area, groin area both sides and palm of hands right>>left (thenar palm and side of fingers). Both ear canals get very itchy and flaky. Many years as well itchy scalp.  --> with treatment with Triamcinolone on hands and axilla and groins and Fluocinonide sol on scalp and ear canal much less itching (about 3x weekly).    Used in Axilla organic deodorant (Schmetz), shampoo = Heavenly essence shampoo, soap = shea moisture (coconut, Hibiscus, shea butter); collagen body lotion, Emu oil, DM Plus serum    Past Medical History:   Patient Active Problem List   Diagnosis     Anorexia     Eczema, unspecified type     Dermatitis, seborrheic     Pruritus     Neoplasm of uncertain behavior of skin     Past Medical History:   Diagnosis Date     Hypothyroid        Allergy History:     Allergies   Allergen Reactions     No Known Drug Allergies    no Asthma  Sometimes in the morning some stuffy nose, no sneezing and some red eyes (no pets)  Flexural eczema since childhood.    Social History:  The patient works as a nurse in ICU. Patient has the following hobbies or non-occupational exposure: running     reports that  has never smoked. she has never used smokeless tobacco. She reports that she does not drink alcohol or use drugs.      Family History:  Family History   Problem Relation Age of Onset     Skin Cancer Mother      Melanoma No family hx of    father has eczemas    Medications:  Current Outpatient Medications   Medication Sig Dispense Refill     cholecalciferol (VITAMIN D3) 1000 UNIT tablet Take 1,000 Units by mouth       fluocinonide (LIDEX) 0.05 % solution Apply topically 2 times daily 60 mL 3     ketoconazole (NIZORAL) 2 % shampoo Apply topically daily as needed for itching or irritation 120 mL 11     levothyroxine (SYNTHROID, LEVOTHROID) 175 MCG tablet Take 175 mcg by mouth daily       triamcinolone (KENALOG) 0.1 % ointment Apply topically 2 times daily 454 g 3     ibuprofen  (ADVIL,MOTRIN) 600 MG tablet Take 1 tablet (600 mg) by mouth every 6 hours as needed for moderate pain (Patient not taking: Reported on 11/8/2018) 30 tablet 1     ibuprofen (ADVIL,MOTRIN) 600 MG tablet Take 1 tablet (600 mg) by mouth every 6 hours as needed for moderate pain (Patient not taking: Reported on 11/8/2018) 30 tablet 1     MAGIC MOUTHWASH, ENTER INGREDIENTS IN COMMENTS, Swish and spit 5-10 mLs in mouth every 6 hours as needed Pharmacy please compound 4 grams of carafate susp in 30 ml of Benadryl (12.5 mg/5 ml), 60 ml Maalox and 30 ml Viscous Lidocaine (Patient not taking: Reported on 11/8/2018) 160 mL 0           Review of Systems:  -As per HPI  -Constitutional: The patient denies fatigue, fevers, chills, unintended weight loss, and night sweats.  -HEENT: Patient denies nonhealing oral sores.  -Skin: As above in HPI. No additional skin concerns.    Physical exam:  Vitals: There were no vitals taken for this visit.  GEN: This is a well developed, well-nourished female in no acute distress, in a pleasant mood.    SKIN: Waist-up skin, which includes the head/face, neck, both arms, chest, back, abdomen, digits and/or nails was examined.  Now only postinflammatory hyperpigmentation axillar ==> in photo from November 2018 subacute-chronic eczema (rather not fungal).   Inguinal now only hyperpigmentation   Right hand over thenar area some dry, hyperkeratotic, slightly red lesions and dry without much erythema over the sides of fingers    -No other lesions of concern on areas examined.     Allergy Tests:    Past Allergy Test: never done    Current Allergy Test:       Order for PATCH TESTS    [x] Outpatient  [] Inpatient: [place]..../ Bed ....      Skin Atopy (atopic dermatitis) [x] Yes   [] No  Rhinitis/Sinusitis:   [x] Yes   [] No  Allergic Asthma:   [] Yes   [x] No  Food Allergy:   [] Yes   [x] No  Leg ulcers:   [] Yes   [x] No  Hand eczema:   [x] Yes   [] No   Leading hand:   [x] R   [] L       [] Ambidextrous                         Reason for tests (suspected allergy): allergic contact dermatitis to topical cosmetics/soaps/fragrances  Known previous allergies: had some reaction to the central part of fit bit    Standardized panels  [x] Standard panel (40 tests)  [x] Preservatives & Antimicrobials (31 tests)  [x] Emulsifiers & Additives (25 tests)   [x] Perfumes/Flavours & Plants (25 tests)  [] Hairdresser panel (12 tests)  [] Rubber Chemicals (22 tests)  [] Plastics (26 tests)  [] Colorants/Dyes/Food additives (20 tests)  [] Metals (implants/dental) (23 tests)  [] Local anaesthetics/NSAIDs (12 tests)  [] Antibiotics & Antimycotics (14 tests)   [] Corticosteroids (15 tests)   [] Photopatch test (32 tests)   [] others: ...      [x] Patient's own products: Shampoo, body wash and deodorant --> fit bit plastic or metal??    DO NOT test if chemical or biological identity is unknown!     always ask from patient the product information and safety sheets (MSDS)     [] Patient needs consultation with Allergy team (changes of tests may apply)  [x] Tests discussed with Allergy team (can have direct appointment for patch tests)       RESUL TS & EVALUATION of PATCH TESTS    Patch test readings after     [x] 2 days, [] 3 days [x] 4 days, [] 5 days,    Applied patch tests with results (import here the list of patch tests):  Date/time of application:01/21/19  Physician/Nurse:  / Amada Paniagua LPN               Localization of application: back (no lesions visible)     STANDARD Series         No Substance 2 days 4 days remarks   1 Isaak Mix [C] - -    2 Colophony - -    3  2-Mercaptobenzothiazole  - -     4 Methylisothiazolinone - -    5 Carba Mix - -    6 Thiuram Mix [A] - -    7 Bisphenol A Epoxy Resin - -    8 J-Wqsf-Uvbynasjnki-Formaldehyde Resin - -    9 Mercapto Mix [A] (+) -    10 Black Rubber Mix- PPD [B] - -    11 Potassium Dichromate  -  -    12 Balsam of [place] (Myroxylon Pereirae Resin) - -    13 Nickel Sulphate  Hexahydrate - -    14 Mixed Dialkyl Thiourea - -    15 Paraben Mix [B] (+) -    16 Methyldibromo Glutaronitrile - -    17 Fragrance Mix - -    18 2-Bromo-2-Nitropropane-1,3-Diol (Bronopol) - -    19 Lyral - -    20 Tixocortol-21- Pivalate - -    21 Diazolidiyl Urea (Germall II) - -    22 Methyl Methacrylate - -    23 Cobalt (II) Chloride Hexahydrate - -    24 Fragrance Mix II  - -    25 Compositae Mix - -    26 Benzoyl Peroxide - -    27 Bacitracin - -    28 Formaldehyde - -    29 Methylchloroisothiazolinone / Methylisothiazolinone - -    30 Corticosteroid Mix - -    31 Sodium Lauryl Sulfate - -    32 Lanolin Alcohol - -    33 Turpentine - -    34 Cetylstearylalcohol - -    35 Chlorhexidine Dicluconate - -    36 Budenoside - -    37 Imidazolidinyl Urea  - -    38 Ethyl-2 Cyanoacrylate - -    39 Quaternium 15 (Dowicil 200) - -    40 Decyl Glucoside - -      EMULSIFIERS & ADDITIVES        No Substance 2 days 4 days remarks   41 Polyethylene Glycol-400 - -    42 Cocamidopropyl Betaine - -    43 Amerchol L101 - -    44 Propylene Glycol - -    45 Triethanolamine - -    46 Sorbitane Sesquiolate - -    47 Isopropylmyristate - -    48 Polysorbate 80  - -    49 Amidoamine   (Stearamidopropyl Dimethylamine) - -    50 Oleamidopropyl Dimethylamine - -    51 Lauryl Glucoside - -    52 Coconut Diethanolamide  - -    53 2-Hydroxy-4-Methoxy Benzophenone (Oxybenzone) - -    54 Benzophenone-4 (Sulisobenzon) - -    55 Propolis - -    56 Dexpanthenol - -    57 Carboxymethyl Cellulose Sodium - -    58 Abitol - -    59 Tert-Butylhydroquinone - -    60 Benzyl Salicylate - -     Antioxidant      61 Dodecyl Gallate - -    62 Butylhydroxyanisole (BHA) - -    63 Butylhydroxytoluene (BHT) - -    64 Di-Alpha-Tocopherol (Vit E) - -    65 Propyl Gallate - -      PERFUMES, FLAVORS & PLANTS         No Substance 2 days 4 days remarks   66 Benzyl Salicylate - -    67 Benzyl Cinnamate (+) -    68 Di-Limonene (Dipentene) - -    69 Cananga Odorata  Rhea Thurman) (I) - -    70 Lichen Acid Mix - -    71 Mentha Piperita Oil (Peppermint Oil) - -    72 Sesquiterpenelactone mix - -    73 Tea Tree Oil, Oxidized - -    74 Wood Tar Mix - -    75 Abietic Acid - -    76 Lavendula Angustifolia Oil (Lavender Oil) - -    77 Camphor  - -     Fragrance Mix I      78 Oakmoss Absolute - -    79 Eugenol - -    80 Geraniol - -    81 Hydroxycitronellal (+) -    82 Isoeugenol - -    83 Cinnamic Aldehyde - -    84 Cinnamic Alcohol  - -    85 Sorbitane Sesquioleate - -     Fragrance mix II      86 Citronellol - -    87 Alpha-Hexylcinnamic Aldehyde    (+) -    88 Citral - -    89 Farnesol - -    90 Coumarin - -      PRESERVATIVES & ANTIMICROBIALS         No Substance 2 days 4 days remarks   91  1,2-Benzisothiazoline-3-One, Sodium Salt - -    92  1,3,5-Sebastian (2-Hydroxyethyl) - Hexahydrotriazine (Grotan BK) - -    93 2-Ardjifstfblio-8-Nitro-1, 3-Propanediol - -    94  3, 4, 4' - Triclocarban - -    95 4 - Chloro - 3 - Cresol - -    96 4 - Chloro - 4 - Xylenol (PCMX) - -    97 7-Ethylbicyclooxazolidine (Quintiqan [id number]) - -    98 Benzalkonium Chloride - -    99 Benzyl Alcohol - -    100 Cetalkonium Chloride - -    101 Cetylpyrimidine Chloride  - -    102 Chloroacetamide - -    103 DMDM Hydantoin - -    104 Glutaraldehyde - -    105 Triclosan - -    106 Glyoxal Trimeric Dihydrate - -    107 Iodopropynyl Butylcarbamate - -    108 Octylisothiazoline - -    109 Iodoform - -    110 (Nitrobutyl) Morpholine/(Ethylnitro-Trimethylene) Dimorpholine (Bioban P 1487) - -    111 Phenoxyethanol - -    112 Phenyl Salicylate - -    113 Povidone Iodine - -    114 Sodium Benzoate - -    115 Sodium Disulfite - -    116 Sorbic Acid - -    117 Thimerosal - -     Parabens      118 Butyl-P-Hydroxybenzoate - -    119 Ethyl-P-Hydroxybenzoate - -    120 Methyl-P-Hydroxybenzoate - -    121 Propyl-P-Hydroxybenzoate - -      PATIENTS OWN PRODUCTS         No Substance Conc  % solv 2 days 4 days remarks   122 Moisturizing  "lotion  Tropical traditions        123 Charcoal and magnesium deodorant        124         125         126         127         128         129         130         131           Patients own products:  è DO NOT test if chemical or biological identity is unknown!   - always ask from patient the product information and safety sheets and consult with the physician   - check neutral pH with pH indicator paper (do not test pH below 5 or over 8 or consult with physician)  - leave-on cosmetics can be tested \"as is\"  - rinse-off products have to be diluted with water, buffer, olive oil or paraffin (discuss with physician)  à remember:   - non-specific toxic/corrosive or biological reactions can occur  - tests with patients own products are not standardized and test conditions are not optimized for patch tests. Whenever possible test with standardized test series and correlate use of product with the result of the patch tests  - if not sure if compounds can be tested under occlusion in patch tests consider open application tests        Results of patch tests:                         Interpretation:    - Negative                    A    = Allergic      (+) Erythema    TI   = Toxic/irritant   + E + Infiltration    RaP = Relevance at Present     ++ E/I + Papulovesicle   Rpr  = Relevance Previously     +++ E/I/P + Blister     nR   = No Relevance    [] No relevant allergic reaction observed    [x] Allergic reaction diagnosed against: ?? Some reactions to fragrances?? Irritant??    Interpretation/ remarks:   See later    [] Patient information given   [] [facility] information   [] [employer] information    ==> final Diagnosis:    1) atopic predisposition with   - atopic dermatitis (flexural) since childhood   - maybe perennial rhinitis (HDM)    2) allergic contact dermatitis (hand, vulva, groins, axilla, scalp, ears) to soaps, disinfectants, fragrances DDx atopic dermatitis      ==> Treatment prescribed/Plan:  > try to use in future " Vanicream cream and Free&Clear Shampoo, soap and conditioner  > if necessary use corticosteroids (triamcinolone oint and Fluocinonide sol)       These conclusions are made at the best of ones knowledge and belief  based on the provided evidence such as patients history and allergy test results and they can change over time or can be incomplete because of missing informations.        Maybe later perform prick test atopy screen

## 2019-01-23 NOTE — LETTER
1/23/2019       RE: Lise Ronquillo  [address]  [place] [address]     Dear Colleague,    Thank you for referring your patient, Lise Ronquillo, to the [facility] DERMATOLOGY at [facility]. Please see a copy of my visit note below.        [facility] Allergy Note      Allergy Problem List:    Specialty Problems        Dermatology Diagnoses    Dermatitis, seborrheic        Eczema, unspecified type        Neoplasm of uncertain behavior of skin        Pruritus            1. Benign nevi, s/p biopsy, outside facility, ~ 2007  2. Eczematous dermatitis with atopic diathesis  - involving antecubital fossae,vulvar skin, triamcinolone ointment BID to trunk and body   - lab evaluation today (11/8/18): CBC, TSH, iron studies, IgE  - patch test referral  3. Seborrheic dermatitis, fluocinonide solution and ketoconazole shampoo  4. Pigmented macule, NUB, s/p shave biopsy 11/8/18, path pending = dysplastic naevus with mild atypia    CC:   Allergies (Lise is here for allergy testing day 3)    Encounter Date: Jan 23, 2019    History of Present Illness:    Ms. Lise Ronquillo is a 38 year old female who presents for skin check and in addition has some itchy spots. Her father has a history of eczema. She is not sure if she has a past history of eczema but has no seasonal allergies and no asthma. Her mom had skin cancer which she believes was NMSC. She feels overall well today.  She states she currently has itchy spots on her skin, marisel itchy external auditory canal and the skin around her left axilla. She will scratch this area on her chest by the axilla constantly.  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 She has tried eczema and psoriasis lotions and coconut oil. No topical steroids. She has similarly itchy vulvar skin and has tried over the counter terbinafine and butenafine topically. She had laser  hair removal of under arms and bikini, last treatment 2012 ( 7 total treatments). She is otherwise well today with no additional skin complaints.     Since childhood recurrent eczemas, initially in flexural areas and recently more on hands and axillar and groin area. Axillar lesions exclusively on left side and left elbow flexural area, groin area both sides and palm of hands right>>left (thenar palm and side of fingers). Both ear canals get very itchy and flaky. Many years as well itchy scalp.  --> with treatment with Triamcinolone on hands and axilla and groins and Fluocinonide sol on scalp and ear canal much less itching (about 3x weekly).    Used in Axilla organic deodorant (Schmetz), shampoo = Heavenly essence shampoo, soap = shea moisture (coconut, Hibiscus, shea butter); collagen body lotion, Emu oil, DM Plus serum    Past Medical History:   Patient Active Problem List   Diagnosis     Anorexia     Eczema, unspecified type     Dermatitis, seborrheic     Pruritus     Neoplasm of uncertain behavior of skin     Past Medical History:   Diagnosis Date     Hypothyroid        Allergy History:     Allergies   Allergen Reactions     No Known Drug Allergies    no Asthma  Sometimes in the morning some stuffy nose, no sneezing and some red eyes (no pets)  Flexural eczema since childhood.    Social History:  The patient works as a nurse in ICU. Patient has the following hobbies or non-occupational exposure: running     reports that  has never smoked. she has never used smokeless tobacco. She reports that she does not drink alcohol or use drugs.      Family History:  Family History   Problem Relation Age of Onset     Skin Cancer Mother      Melanoma No family hx of    father has eczemas    Medications:  Current Outpatient Medications   Medication Sig Dispense Refill     cholecalciferol (VITAMIN D3) 1000 UNIT tablet Take 1,000 Units by mouth       fluocinonide (LIDEX) 0.05 % solution Apply topically 2 times daily 60 mL 3      ketoconazole (NIZORAL) 2 % shampoo Apply topically daily as needed for itching or irritation 120 mL 11     levothyroxine (SYNTHROID, LEVOTHROID) 175 MCG tablet Take 175 mcg by mouth daily       triamcinolone (KENALOG) 0.1 % ointment Apply topically 2 times daily 454 g 3     ibuprofen (ADVIL,MOTRIN) 600 MG tablet Take 1 tablet (600 mg) by mouth every 6 hours as needed for moderate pain (Patient not taking: Reported on 11/8/2018) 30 tablet 1     ibuprofen (ADVIL,MOTRIN) 600 MG tablet Take 1 tablet (600 mg) by mouth every 6 hours as needed for moderate pain (Patient not taking: Reported on 11/8/2018) 30 tablet 1     MAGIC MOUTHWASH, ENTER INGREDIENTS IN COMMENTS, Swish and spit 5-10 mLs in mouth every 6 hours as needed Pharmacy please compound 4 grams of carafate susp in 30 ml of Benadryl (12.5 mg/5 ml), 60 ml Maalox and 30 ml Viscous Lidocaine (Patient not taking: Reported on 11/8/2018) 160 mL 0           Review of Systems:  -As per HPI  -Constitutional: The patient denies fatigue, fevers, chills, unintended weight loss, and night sweats.  -HEENT: Patient denies nonhealing oral sores.  -Skin: As above in HPI. No additional skin concerns.    Physical exam:  Vitals: There were no vitals taken for this visit.  GEN: This is a well developed, well-nourished female in no acute distress, in a pleasant mood.    SKIN: Waist-up skin, which includes the head/face, neck, both arms, chest, back, abdomen, digits and/or nails was examined.  Now only postinflammatory hyperpigmentation axillar ==> in photo from November 2018 subacute-chronic eczema (rather not fungal).   Inguinal now only hyperpigmentation   Right hand over thenar area some dry, hyperkeratotic, slightly red lesions and dry without much erythema over the sides of fingers    -No other lesions of concern on areas examined.     Allergy Tests:    Past Allergy Test: never done    Current Allergy Test:       Order for PATCH TESTS    [x] Outpatient  [] Inpatient: [place]..../  Bed ....      Skin Atopy (atopic dermatitis) [x] Yes   [] No  Rhinitis/Sinusitis:   [x] Yes   [] No  Allergic Asthma:   [] Yes   [x] No  Food Allergy:   [] Yes   [x] No  Leg ulcers:   [] Yes   [x] No  Hand eczema:   [x] Yes   [] No   Leading hand:   [x] R   [] L       [] Ambidextrous                        Reason for tests (suspected allergy): allergic contact dermatitis to topical cosmetics/soaps/fragrances  Known previous allergies: had some reaction to the central part of fit bit    Standardized panels  [x] Standard panel (40 tests)  [x] Preservatives & Antimicrobials (31 tests)  [x] Emulsifiers & Additives (25 tests)   [x] Perfumes/Flavours & Plants (25 tests)  [] Hairdresser panel (12 tests)  [] Rubber Chemicals (22 tests)  [] Plastics (26 tests)  [] Colorants/Dyes/Food additives (20 tests)  [] Metals (implants/dental) (23 tests)  [] Local anaesthetics/NSAIDs (12 tests)  [] Antibiotics & Antimycotics (14 tests)   [] Corticosteroids (15 tests)   [] Photopatch test (32 tests)   [] others: ...      [x] Patient's own products: Shampoo, body wash and deodorant --> fit bit plastic or metal??    DO NOT test if chemical or biological identity is unknown!     always ask from patient the product information and safety sheets (MSDS)     [] Patient needs consultation with Allergy team (changes of tests may apply)  [x] Tests discussed with Allergy team (can have direct appointment for patch tests)       RESUL TS & EVALUATION of PATCH TESTS    Patch test readings after     [x] 2 days, [] 3 days [x] 4 days, [] 5 days,    Applied patch tests with results (import here the list of patch tests):  Date/time of application:01/21/19  Physician/Nurse:  / Amada Paniagua LPN               Localization of application: back (no lesions visible)     STANDARD Series         No Substance 2 days 4 days remarks   1 Isaak Mix [C] - -    2 Colophony - -    3  2-Mercaptobenzothiazole  - -     4 Methylisothiazolinone - -    5 Carba  Mix - -    6 Thiuram Mix [A] - -    7 Bisphenol A Epoxy Resin - -    8 D-Iuoc-Jenxkgcrtwb-Formaldehyde Resin - -    9 Mercapto Mix [A] (+) -    10 Black Rubber Mix- PPD [B] - -    11 Potassium Dichromate  -  -    12 Balsam of [place] (Myroxylon Pereirae Resin) - -    13 Nickel Sulphate Hexahydrate - -    14 Mixed Dialkyl Thiourea - -    15 Paraben Mix [B] (+) -    16 Methyldibromo Glutaronitrile - -    17 Fragrance Mix - -    18 2-Bromo-2-Nitropropane-1,3-Diol (Bronopol) - -    19 Lyral - -    20 Tixocortol-21- Pivalate - -    21 Diazolidiyl Urea (Germall II) - -    22 Methyl Methacrylate - -    23 Cobalt (II) Chloride Hexahydrate - -    24 Fragrance Mix II  - -    25 Compositae Mix - -    26 Benzoyl Peroxide - -    27 Bacitracin - -    28 Formaldehyde - -    29 Methylchloroisothiazolinone / Methylisothiazolinone - -    30 Corticosteroid Mix - -    31 Sodium Lauryl Sulfate - -    32 Lanolin Alcohol - -    33 Turpentine - -    34 Cetylstearylalcohol - -    35 Chlorhexidine Dicluconate - -    36 Budenoside - -    37 Imidazolidinyl Urea  - -    38 Ethyl-2 Cyanoacrylate - -    39 Quaternium 15 (Dowicil 200) - -    40 Decyl Glucoside - -      EMULSIFIERS & ADDITIVES        No Substance 2 days 4 days remarks   41 Polyethylene Glycol-400 - -    42 Cocamidopropyl Betaine - -    43 Amerchol L101 - -    44 Propylene Glycol - -    45 Triethanolamine - -    46 Sorbitane Sesquiolate - -    47 Isopropylmyristate - -    48 Polysorbate 80  - -    49 Amidoamine   (Stearamidopropyl Dimethylamine) - -    50 Oleamidopropyl Dimethylamine - -    51 Lauryl Glucoside - -    52 Coconut Diethanolamide  - -    53 2-Hydroxy-4-Methoxy Benzophenone (Oxybenzone) - -    54 Benzophenone-4 (Sulisobenzon) - -    55 Propolis - -    56 Dexpanthenol - -    57 Carboxymethyl Cellulose Sodium - -    58 Abitol - -    59 Tert-Butylhydroquinone - -    60 Benzyl Salicylate - -     Antioxidant      61 Dodecyl Gallate - -    62 Butylhydroxyanisole (BHA) - -    63  Butylhydroxytoluene (BHT) - -    64 Di-Alpha-Tocopherol (Vit E) - -    65 Propyl Gallate - -      PERFUMES, FLAVORS & PLANTS         No Substance 2 days 4 days remarks   66 Benzyl Salicylate - -    67 Benzyl Cinnamate (+) -    68 Di-Limonene (Dipentene) - -    69 Cananga Odorata (Olman Thurman) (I) - -    70 Lichen Acid Mix - -    71 Mentha Piperita Oil (Peppermint Oil) - -    72 Sesquiterpenelactone mix - -    73 Tea Tree Oil, Oxidized - -    74 Wood Tar Mix - -    75 Abietic Acid - -    76 Lavendula Angustifolia Oil (Lavender Oil) - -    77 Camphor  - -     Fragrance Mix I      78 Oakmoss Absolute - -    79 Eugenol - -    80 Geraniol - -    81 Hydroxycitronellal (+) -    82 Isoeugenol - -    83 Cinnamic Aldehyde - -    84 Cinnamic Alcohol  - -    85 Sorbitane Sesquioleate - -     Fragrance mix II      86 Citronellol - -    87 Alpha-Hexylcinnamic Aldehyde    (+) -    88 Citral - -    89 Farnesol - -    90 Coumarin - -      PRESERVATIVES & ANTIMICROBIALS         No Substance 2 days 4 days remarks   91  1,2-Benzisothiazoline-3-One, Sodium Salt - -    92  1,3,5-Sebastian (2-Hydroxyethyl) - Hexahydrotriazine (Grotan BK) - -    93 6-Yvtlkdbskcfcd-3-Nitro-1, 3-Propanediol - -    94  3, 4, 4' - Triclocarban - -    95 4 - Chloro - 3 - Cresol - -    96 4 - Chloro - 4 - Xylenol (PCMX) - -    97 7-Ethylbicyclooxazolidine (Bioban [id number]) - -    98 Benzalkonium Chloride - -    99 Benzyl Alcohol - -    100 Cetalkonium Chloride - -    101 Cetylpyrimidine Chloride  - -    102 Chloroacetamide - -    103 DMDM Hydantoin - -    104 Glutaraldehyde - -    105 Triclosan - -    106 Glyoxal Trimeric Dihydrate - -    107 Iodopropynyl Butylcarbamate - -    108 Octylisothiazoline - -    109 Iodoform - -    110 (Nitrobutyl) Morpholine/(Ethylnitro-Trimethylene) Dimorpholine (Bioban P 1487) - -    111 Phenoxyethanol - -    112 Phenyl Salicylate - -    113 Povidone Iodine - -    114 Sodium Benzoate - -    115 Sodium Disulfite - -    116 Sorbic Acid - -   "  117 Thimerosal - -     Parabens      118 Butyl-P-Hydroxybenzoate - -    119 Ethyl-P-Hydroxybenzoate - -    120 Methyl-P-Hydroxybenzoate - -    121 Propyl-P-Hydroxybenzoate - -      PATIENTS OWN PRODUCTS         No Substance Conc  % solv 2 days 4 days remarks   122 Moisturizing lotion  Tropical traditions        123 Charcoal and magnesium deodorant        124         125         126         127         128         129         130         131           Patients own products:  è DO NOT test if chemical or biological identity is unknown!   - always ask from patient the product information and safety sheets and consult with the physician   - check neutral pH with pH indicator paper (do not test pH below 5 or over 8 or consult with physician)  - leave-on cosmetics can be tested \"as is\"  - rinse-off products have to be diluted with water, buffer, olive oil or paraffin (discuss with physician)  à remember:   - non-specific toxic/corrosive or biological reactions can occur  - tests with patients own products are not standardized and test conditions are not optimized for patch tests. Whenever possible test with standardized test series and correlate use of product with the result of the patch tests  - if not sure if compounds can be tested under occlusion in patch tests consider open application tests        Results of patch tests:                         Interpretation:    - Negative                    A    = Allergic      (+) Erythema    TI   = Toxic/irritant   + E + Infiltration    RaP = Relevance at Present     ++ E/I + Papulovesicle   Rpr  = Relevance Previously     +++ E/I/P + Blister     nR   = No Relevance    [] No relevant allergic reaction observed    [x] Allergic reaction diagnosed against: ?? Some reactions to fragrances?? Irritant??    Interpretation/ remarks:   See later    [] Patient information given   [] [facility]SD information   [] [employer] information    ==> final Diagnosis:    1) atopic predisposition " with   - atopic dermatitis (flexural) since childhood   - maybe perennial rhinitis (HDM)    2) allergic contact dermatitis (hand, vulva, groins, axilla, scalp, ears) to soaps, disinfectants, fragrances DDx atopic dermatitis      ==> Treatment prescribed/Plan:  > try to use in future Vanicream cream and Free&Clear Shampoo, soap and conditioner  > if necessary use corticosteroids (triamcinolone oint and Fluocinonide sol)       These conclusions are made at the best of ones knowledge and belief  based on the provided evidence such as patients history and allergy test results and they can change over time or can be incomplete because of missing informations.        Maybe later perform prick test atopy screen      Again, thank you for allowing me to participate in the care of your patient.      Sincerely,    Tan Awad MD

## 2019-01-23 NOTE — NURSING NOTE
Dermatology Rooming Note    Lise Ronquillo's goals for this visit include:   Chief Complaint   Patient presents with     Allergies     Lise is here for allergy testing day 3     Kimmie Whalen, CMA

## 2019-01-25 ENCOUNTER — OFFICE VISIT (OUTPATIENT)
Dept: DERMATOLOGY | Facility: CLINIC | Age: 39
End: 2019-01-25
Payer: COMMERCIAL

## 2019-01-25 DIAGNOSIS — L20.89 OTHER ATOPIC DERMATITIS: Primary | ICD-10-CM

## 2019-01-25 RX ORDER — PIMECROLIMUS 10 MG/G
CREAM TOPICAL 2 TIMES DAILY PRN
Qty: 60 G | Refills: 3 | Status: SHIPPED | OUTPATIENT
Start: 2019-01-25 | End: 2020-01-25

## 2019-01-25 ASSESSMENT — PAIN SCALES - GENERAL: PAINLEVEL: NO PAIN (0)

## 2019-01-25 NOTE — PROGRESS NOTES
[facility] Allergy Note      Allergy Problem List:    Specialty Problems        Dermatology Diagnoses    Dermatitis, seborrheic        Eczema, unspecified type        Neoplasm of uncertain behavior of skin        Pruritus            1. Benign nevi, s/p biopsy, outside facility, ~ 2007  2. Atopic dermatitis  - involving antecubital fossae, vulvar skin, triamcinolone ointment BID to trunk and body   - lab evaluation (11/8/18): CBC WNL, low TSH, iron studies WNL, IgE WNL, ferritin elevated  - patch testing done 1/20/19  3. Seborrheic dermatitis, fluocinonide solution and ketoconazole shampoo  4. Dysplastic nevus with mild atypia, left medial thigh, s/p shave biopsy 11/8/18    CC:   Allergies (Lise is here for allergy testing day 5.)    Encounter Date: Jan 25, 2019    History of Present Illness:    Ms. Lise Ronquillo is a 38 year old female who presents for patch test day 5. Says that she has been doing well without much itchiness. She is more bothered by the stickiness of the tape. She has not showered since her last appointment or used any topical medications in that time. She has stopped using all past products. Notes that she previously had a reaction to her FitBit.       Pertinent history:  Her father has a history of eczema. She is not sure if she has a past history of eczema but has no seasonal allergies and no asthma. Her mom had skin cancer which she believes was NMSC. She feels overall well today. Has itchy spots on her skin, marisel itchy external auditory canal and the skin around her left axilla. She will scratch this area on her chest by the axilla constantly.  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 She has tried eczema and psoriasis lotions and coconut oil. No topical steroids. She has similarly itchy vulvar skin and has tried over the counter terbinafine and butenafine topically. She had laser hair removal of under arms and bikini,  last treatment 2012 ( 7 total treatments).     Since childhood recurrent eczemas, initially in flexural areas and recently more on hands and axillar and groin area. Axillary lesions exclusively on left side and left elbow flexural area, groin area both sides and palm of hands right>>left (thenar palm and side of fingers). Both ear canals get very itchy and flaky. Many years as well itchy scalp.  --> with treatment with Triamcinolone on hands and axilla and groins and Fluocinonide sol on scalp and ear canal much less itching (about 3x weekly).    Used in Axilla organic deodorant (Schmetz), shampoo = Heavenly essence shampoo, soap = shea moisture (coconut, Hibiscus, shea butter); collagen body lotion, Emu oil, DM Plus serum    Past Medical History:   Patient Active Problem List   Diagnosis     Anorexia     Eczema, unspecified type     Dermatitis, seborrheic     Pruritus     Neoplasm of uncertain behavior of skin     Past Medical History:   Diagnosis Date     Hypothyroid        Allergy History:     Allergies   Allergen Reactions     No Known Drug Allergies    no Asthma  Sometimes in the morning some stuffy nose, no sneezing and some red eyes (no pets)  Flexural eczema since childhood.    Social History:  The patient works as a nurse in ICU. Patient has the following hobbies or non-occupational exposure: running     reports that  has never smoked. she has never used smokeless tobacco. She reports that she does not drink alcohol or use drugs.      Family History:  Family History   Problem Relation Age of Onset     Skin Cancer Mother      Melanoma No family hx of    father has eczemas    Medications:  Current Outpatient Medications   Medication Sig Dispense Refill     cholecalciferol (VITAMIN D3) 1000 UNIT tablet Take 1,000 Units by mouth       fluocinonide (LIDEX) 0.05 % solution Apply topically 2 times daily 60 mL 3     ketoconazole (NIZORAL) 2 % shampoo Apply topically daily as needed for itching or irritation 120 mL  11     levothyroxine (SYNTHROID, LEVOTHROID) 175 MCG tablet Take 175 mcg by mouth daily       MAGIC MOUTHWASH, ENTER INGREDIENTS IN COMMENTS, Swish and spit 5-10 mLs in mouth every 6 hours as needed Pharmacy please compound 4 grams of carafate susp in 30 ml of Benadryl (12.5 mg/5 ml), 60 ml Maalox and 30 ml Viscous Lidocaine 160 mL 0     triamcinolone (KENALOG) 0.1 % ointment Apply topically 2 times daily 454 g 3     ibuprofen (ADVIL,MOTRIN) 600 MG tablet Take 1 tablet (600 mg) by mouth every 6 hours as needed for moderate pain (Patient not taking: Reported on 11/8/2018) 30 tablet 1     ibuprofen (ADVIL,MOTRIN) 600 MG tablet Take 1 tablet (600 mg) by mouth every 6 hours as needed for moderate pain (Patient not taking: Reported on 11/8/2018) 30 tablet 1           Review of Systems:  -As per HPI  -Constitutional: The patient denies fatigue, fevers, chills, unintended weight loss, and night sweats.  -HEENT: Patient denies nonhealing oral sores.  -Skin: As above in HPI. No additional skin concerns.    Physical exam:  Vitals: There were no vitals taken for this visit.  GEN: This is a well developed, well-nourished female in no acute distress, in a pleasant mood.    SKIN: Waist-up skin, which includes the head/face, neck, both arms, chest, back, abdomen, digits and/or nails was examined.  Now only postinflammatory hyperpigmentation axillar ==> in photo from November 2018 subacute-chronic eczema (rather not fungal).   Inguinal now only hyperpigmentation   Right hand over thenar area some dry, hyperkeratotic, slightly red lesions and dry without much erythema over the sides of fingers    -No other lesions of concern on areas examined.     Allergy Tests:    Past Allergy Test: never done    Current Allergy Test:       Order for PATCH TESTS    [x] Outpatient  [] Inpatient: [place]..../ Bed ....      Skin Atopy (atopic dermatitis) [x] Yes   [] No  Rhinitis/Sinusitis:   [x] Yes   [] No  Allergic Asthma:   [] Yes   [x] No  Food  Allergy:   [] Yes   [x] No  Leg ulcers:   [] Yes   [x] No  Hand eczema:   [x] Yes   [] No   Leading hand:   [x] R   [] L       [] Ambidextrous                        Reason for tests (suspected allergy): allergic contact dermatitis to topical cosmetics/soaps/fragrances  Known previous allergies: had some reaction to the central part of fit bit    Standardized panels  [x] Standard panel (40 tests)  [x] Preservatives & Antimicrobials (31 tests)  [x] Emulsifiers & Additives (25 tests)   [x] Perfumes/Flavours & Plants (25 tests)  [] Hairdresser panel (12 tests)  [] Rubber Chemicals (22 tests)  [] Plastics (26 tests)  [] Colorants/Dyes/Food additives (20 tests)  [] Metals (implants/dental) (23 tests)  [] Local anaesthetics/NSAIDs (12 tests)  [] Antibiotics & Antimycotics (14 tests)   [] Corticosteroids (15 tests)   [] Photopatch test (32 tests)   [] others: ...      [x] Patient's own products: Shampoo, body wash and deodorant --> fit bit plastic or metal??    DO NOT test if chemical or biological identity is unknown!     always ask from patient the product information and safety sheets (MSDS)     [] Patient needs consultation with Allergy team (changes of tests may apply)  [x] Tests discussed with Allergy team (can have direct appointment for patch tests)       RESUL TS & EVALUATION of PATCH TESTS    Patch test readings after     [x] 2 days, [] 3 days [x] 4 days, [] 5 days,    Applied patch tests with results (import here the list of patch tests):  Date/time of application:01/21/19  Physician/Nurse:  / Amada Paniagua LPN               Localization of application: back (no lesions visible)     STANDARD Series         No Substance 2 days 4 days remarks   1 Isaak Mix [C] - -    2 Colophony - -    3  2-Mercaptobenzothiazole  - -     4 Methylisothiazolinone - -    5 Carba Mix - -    6 Thiuram Mix [A] - -    7 Bisphenol A Epoxy Resin - -    8 S-Ulkk-Zctnaahtcbf-Formaldehyde Resin - -    9 Mercapto Mix [A] (+) -     10 Black Rubber Mix- PPD [B] - -    11 Potassium Dichromate  -  -    12 Balsam of [place] (Myroxylon Pereirae Resin) - -    13 Nickel Sulphate Hexahydrate - +/++    14 Mixed Dialkyl Thiourea - -    15 Paraben Mix [B] (+) -    16 Methyldibromo Glutaronitrile - -    17 Fragrance Mix - -    18 2-Bromo-2-Nitropropane-1,3-Diol (Bronopol) - -    19 Lyral - -    20 Tixocortol-21- Pivalate - -    21 Diazolidiyl Urea (Germall II) - -    22 Methyl Methacrylate - -    23 Cobalt (II) Chloride Hexahydrate - -    24 Fragrance Mix II  - -    25 Compositae Mix - -    26 Benzoyl Peroxide - -    27 Bacitracin - -    28 Formaldehyde - -    29 Methylchloroisothiazolinone / Methylisothiazolinone - -    30 Corticosteroid Mix - -    31 Sodium Lauryl Sulfate - -    32 Lanolin Alcohol - -    33 Turpentine - -    34 Cetylstearylalcohol - -    35 Chlorhexidine Dicluconate - -    36 Budenoside - -    37 Imidazolidinyl Urea  - -    38 Ethyl-2 Cyanoacrylate - -    39 Quaternium 15 (Dowicil 200) - -    40 Decyl Glucoside - -      EMULSIFIERS & ADDITIVES        No Substance 2 days 4 days remarks   41 Polyethylene Glycol-400 - -    42 Cocamidopropyl Betaine - -    43 Amerchol L101 - -    44 Propylene Glycol - -    45 Triethanolamine - -    46 Sorbitane Sesquiolate - -    47 Isopropylmyristate - -    48 Polysorbate 80  - -    49 Amidoamine   (Stearamidopropyl Dimethylamine) - -    50 Oleamidopropyl Dimethylamine - -    51 Lauryl Glucoside - -    52 Coconut Diethanolamide  - -    53 2-Hydroxy-4-Methoxy Benzophenone (Oxybenzone) - -    54 Benzophenone-4 (Sulisobenzon) - (+) No palpable erythema; only limited to test site; irritant?   55 Propolis - -    56 Dexpanthenol - -    57 Carboxymethyl Cellulose Sodium - -    58 Abitol - -    59 Tert-Butylhydroquinone - -    60 Benzyl Salicylate - -     Antioxidant      61 Dodecyl Gallate - -    62 Butylhydroxyanisole (BHA) - -    63 Butylhydroxytoluene (BHT) - -    64 Di-Alpha-Tocopherol (Vit E) - -    65 Propyl  Gallate - -      PERFUMES, FLAVORS & PLANTS         No Substance 2 days 4 days remarks   66 Benzyl Salicylate - -    67 Benzyl Cinnamate (+) -    68 Di-Limonene (Dipentene) - -    69 Cananga Odorata (Olman Thurman) (I) - -    70 Lichen Acid Mix - -    71 Mentha Piperita Oil (Peppermint Oil) - -    72 Sesquiterpenelactone mix - -    73 Tea Tree Oil, Oxidized - -    74 Wood Tar Mix - -    75 Abietic Acid - -    76 Lavendula Angustifolia Oil (Lavender Oil) - -    77 Camphor  - -     Fragrance Mix I      78 Oakmoss Absolute - -    79 Eugenol - -    80 Geraniol - -    81 Hydroxycitronellal (+) -    82 Isoeugenol - -    83 Cinnamic Aldehyde - -    84 Cinnamic Alcohol  - -    85 Sorbitane Sesquioleate - -     Fragrance mix II      86 Citronellol - -    87 Alpha-Hexylcinnamic Aldehyde    (+) -    88 Citral - -    89 Farnesol - -    90 Coumarin - -      PRESERVATIVES & ANTIMICROBIALS         No Substance 2 days 4 days remarks   91  1,2-Benzisothiazoline-3-One, Sodium Salt - -    92  1,3,5-Sebastian (2-Hydroxyethyl) - Hexahydrotriazine (Grotan BK) - -    93 3-Fnerpxtsdhgfa-4-Nitro-1, 3-Propanediol - -    94  3, 4, 4' - Triclocarban - -    95 4 - Chloro - 3 - Cresol - -    96 4 - Chloro - 4 - Xylenol (PCMX) - -    97 7-Ethylbicyclooxazolidine (Bioban [id number]) - -    98 Benzalkonium Chloride - -    99 Benzyl Alcohol - -    100 Cetalkonium Chloride - -    101 Cetylpyrimidine Chloride  - -    102 Chloroacetamide - -    103 DMDM Hydantoin - -    104 Glutaraldehyde - -    105 Triclosan - -    106 Glyoxal Trimeric Dihydrate - -    107 Iodopropynyl Butylcarbamate - -    108 Octylisothiazoline - -    109 Iodoform - -    110 (Nitrobutyl) Morpholine/(Ethylnitro-Trimethylene) Dimorpholine (Bioban P 0547) - -    111 Phenoxyethanol - -    112 Phenyl Salicylate - -    113 Povidone Iodine - -    114 Sodium Benzoate - -    115 Sodium Disulfite - -    116 Sorbic Acid - -    117 Thimerosal - -     Parabens      118 Butyl-P-Hydroxybenzoate - -    119  "Ethyl-P-Hydroxybenzoate - -    120 Methyl-P-Hydroxybenzoate - -    121 Propyl-P-Hydroxybenzoate - -      PATIENTS OWN PRODUCTS         No Substance Conc  % solv 2 days 4 days remarks   122 Moisturizing lotion  Tropical traditions        123 Charcoal and magnesium deodorant        124         125         126         127         128         129         130         131           Patients own products:  è DO NOT test if chemical or biological identity is unknown!   - always ask from patient the product information and safety sheets and consult with the physician   - check neutral pH with pH indicator paper (do not test pH below 5 or over 8 or consult with physician)  - leave-on cosmetics can be tested \"as is\"  - rinse-off products have to be diluted with water, buffer, olive oil or paraffin (discuss with physician)  à remember:   - non-specific toxic/corrosive or biological reactions can occur  - tests with patients own products are not standardized and test conditions are not optimized for patch tests. Whenever possible test with standardized test series and correlate use of product with the result of the patch tests  - if not sure if compounds can be tested under occlusion in patch tests consider open application tests        Results of patch tests:                         Interpretation:    - Negative                    A    = Allergic      (+) Erythema    TI   = Toxic/irritant   + E + Infiltration    RaP = Relevance at Present     ++ E/I + Papulovesicle   Rpr  = Relevance Previously     +++ E/I/P + Blister     nR   = No Relevance    [x] Allergic reaction diagnosed against: +/++ nickel    Interpretation/ remarks:   Has never worn fashion jewellery and therefore had never problems. Maybe some metal piece on Fitbit might explain the reaction to Nickel. The sensibilisation against Nickel supports the diagnosis of atopic predisposition and atopic dermatitis responsible for the recurrent eczemas. They could be aggravated " by toxic/irritant reactions to cosmetics/fragrances in soaps and shampoos.    [x] Patient information given   [] [facility] information   [x] SmartPractice information: Nickel    ==> final Diagnosis:    >> atopic predisposition with   - atopic dermatitis (flexural) since childhood   - maybe perennial rhinitis (HDM)   - contact sensibilisation against Nickel metals   - atopic dermatitis on hand/vulva/groins/axilla/scalp/ears) without proven sensiblisation to  additives in cosmetics (toxic/irritant reaction possible)      ==> Treatment prescribed/Plan:  > try to use in future Vanicream cream and Free&Clear Shampoo, soap and conditioner  > if necessary use corticosteroids (triamcinolone oint and Fluocinonide sol) on weekends to head but start elidel 1% cream BID on weekdays  > if still having flares, then phototherapy might be an option      Maybe later perform prick test atopy screen      RTC with Dr. Reynoso in 2-3 months      These conclusions are made at the best of ones knowledge and belief  based on the provided evidence such as patients history and allergy test results and they can change over time or can be incomplete because of missing informations.      I personally spent 20 minutes counseling the patient.

## 2019-01-25 NOTE — NURSING NOTE
Dermatology Rooming Note    Lise Ronquillo's goals for this visit include:   Chief Complaint   Patient presents with     Allergies     Lise is here for allergy testing day 5.     Kimmie Whalen, CMA

## 2019-01-25 NOTE — LETTER
February 1, 2019      Lise Ronquillo  [address]  [place] [address]              Dear ,    Thank you for referring your patient, Lise Ronquillo, for allergy testing. This patient was seen on 1/21/19, 1/23/19, and 1/25/19 for patch testing. The below compounds were tested. Please see below for my interpretation and a list of tests preformed.       Final Diagnosis  >> atopic predisposition with              - atopic dermatitis (flexural) since childhood              - maybe perennial rhinitis (HDM)              - contact sensibilisation against Nickel metals              - atopic dermatitis on hand/vulva/groins/axilla/scalp/ears) without proven sensiblisation to         additives in cosmetics (toxic/irritant reaction possible)       Treatment/Plan  > try to use in future Vanicream cream and Free&Clear Shampoo, soap and conditioner  > if necessary use corticosteroids (triamcinolone oint and Fluocinonide sol) on weekends to head but start elidel 1% cream BID on weekdays  > if still having flares, then phototherapy might be an option  Maybe later perform prick test atopy screen          Positive Reactions:  +/++ nickel    Has never worn fashion jewellery and therefore had never problems. Maybe some metal piece on Fitbit might explain the reaction to Nickel. The sensibilisation against Nickel supports the diagnosis of atopic predisposition and atopic dermatitis responsible for the recurrent eczemas. They could be aggravated by toxic/irritant reactions to cosmetics/fragrances in soaps and shampoos.       Tested Products  (Standard) Isaak Mix [C], Colophony, 2-Mercaptobenzothiazole, Methylisothiazolinone, Carba Mix, Thiuram Mix [A], Bisphenol A Epoxy Resin, L-Gnzt-Gdsxfjxhhws Formaldehyde Resin, Mercapto Mix [A], Black Rubber Mix- PPD [B], Potassium Dichromate, Balsam of [place] (Myroxylon Pereirae Resin), Nickel Sulphate Hexahydrate, Mixed Dialkyl Thiourea, Paraben Mix [B], Methyldibromo  Glutaro-Nitrile, Fragrance mix,  2-Bromo-2-nitropropane-1,3-diol (Bronopol), Lyral, Tixocortol-21-Pivalate, Diazolidiyl Urea (Germall II), Methyl Methacrylate, Cobalt (II) Chloride Hexahydrate, Fragrance Mix II, Compositae Mix, Benzoyl Peroxide, Bacitracin, Formaldehyde, Methylchloroisothiazolinone / Methylisothiazolinone, Corticoseteroid Mix, Sodium Lauryl Sulfate, Lanolin Alcohol, Oil of Turpentine, Cetylstearylalcohol, Chlorhexidine Dicluconate, Budenoside, Imidazolidinyl Urea, Ethyl Cyanoacrylate, Quaternium 15, Decyl Glucoside (Preservatives and Antimicrobial) 1,2-benzisothiazoline-3-one, Sodium Salt, 1,3,5-Sebastian(2-Hydroxyethyl) -Hexahydrotriazine(Grotan BK), 2-Hydroxymethyl- 2-Nitro-1,3-Propanediol, 3,4,4'-Triclocarban, 4-Chloro-3- Cresol, 4-Chloro-3-5-Xylénol (PCMX), 7-Ethylbicyclooxazolidine (BioBan CS 1246), Benzalkonium Chloride, Benzyl Alcohol, Cetalkonium Chloride, Cetylpyrimidine Chloride, Chloroacetamide, DMDM Hydantoin, Glutaraldehyde, Triclosan, Glyoxal Trimeric Dihydrate, Iodopropynyl Butylcarbamate, Octylisothiazolinone, Iodoform, (Nitrobutyl) Morpholine/ (Ethylnitrotrimethylene) dimorpholine(Biopan ), Phenoxyethanol, Phenyl Salicylate, Povidone Iodine, Sodium Benzoate, Sodium Disulfite, Sorbic Acid, Thimerosal, Butyl-P-Hydroxybenzoate, Ethyl-P-Hydroxybenzoate, Methyl-P-Hydroxybenzoate, Propyl-P-Hydroxybenzoate (Emulsifiers) Polyethylene Glycol-400, Cocamidopropyl Betaine, Amerchol L101, Propylene Glycol, Triethanolamine, Sorbitane Sesquiolate, Isopropylmyristate, Polysorbate 80, Amidoamine (Stearamidopropyl Dimethylamine), Oleamidopropyl Dimethylamine, Lauryl Glucoside, Coconut Diethanolamide, 2-Hydroxy-4-Methoxybenzo-Phenone (Oxybenzone), Benzophenone-4 (Sulisobenzon), Propolis, Dexpanthenol, Carboxymethyl Cellulose Sodium, Abitol, Tert- Butylhydroquinone, Benzyl Salicylate, Dodecyl Gallate, Butylhydroxyanisole (BHA), Butylhydroxytoluene (BHT), Di-Alpha-Tocopherol, Propyl Gallate (Perfume,  Flavors, and Plants) Benzyl Salicylate, Benzyl Cinnamate, Dl-Limonene (Dipentene), Cananga Odorata ((Ylang Ylang)(I)), Lichen Acid Mix, Mentha Piperita Oil (Peppermint Oil), Sesquiterpene Lactone Mix, Tea Tree Oil- Oxidized, Wood Tar Mix, Abietic Acid, Lavendula Angustifolia Oil (Lavender Oil), Camphor, Oak Bauman Absolute, Eugenol, Geraniol, Hydroxycitronellal, Isoeugenol, Cinnamic Aldehyde, Cinnamic Alcohol, Sorbitane Sesquioleate, Citronellol, Alpha-Hexylcinnamic Aldehyde, Citral, Farnesol, Coumarin     Information Given  SmartPractice information: Nickel      These conclusions are made at the best of ones knowledge and belief  based on the provided evidence such as patients history and allergy test results and they can change over time or can be incomplete because of missing informations.       If you have any questions please call [phone]        Sincerely,    Tan Awad MD

## 2019-01-25 NOTE — LETTER
1/25/2019       RE: Lise Ronquillo  [address]  [place] [address]     Dear Colleague,    Thank you for referring your patient, Lise Ronquillo, to the [facility] DERMATOLOGY at [facility]. Please see a copy of my visit note below.        [facility] Allergy Note      Allergy Problem List:    Specialty Problems        Dermatology Diagnoses    Dermatitis, seborrheic        Eczema, unspecified type        Neoplasm of uncertain behavior of skin        Pruritus            1. Benign nevi, s/p biopsy, outside facility, ~ 2007  2.  Atopic dermatitis  - involving antecubital fossae, vulvar skin, triamcinolone ointment BID to trunk and body   - lab evaluation (11/8/18): CBC WNL, low TSH, iron studies WNL, IgE WNL, ferritin elevated  - patch testing done 1/20/19  3. Seborrheic dermatitis, fluocinonide solution and ketoconazole shampoo  4. Dysplastic nevus with mild atypia, left medial thigh, s/p shave biopsy 11/8/18    CC:   Allergies (Lise is here for allergy testing day 5.)    Encounter Date: Jan 25, 2019    History of Present Illness:    Ms. Lise Ronquillo is a 38 year old female who presents for patch test day 5.  Says that she has been doing well without much itchiness. She is more bothered by the stickiness of the tape. She has not showered since her last appointment or used any topical medications in that time. She has stopped using all past products. Notes that she previously had a reaction to her FitBit.       Pertinent history:  Her father has a history of eczema. She is not sure if she has a past history of eczema but has no seasonal allergies and no asthma. Her mom had skin cancer which she believes was NMSC. She feels overall well today.  Has itchy spots on her skin, marisel itchy external auditory canal and the skin around her left axilla. She will scratch this area on her chest by the axilla constantly.  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 She has tried eczema and psoriasis lotions and coconut oil. No topical steroids. She has similarly itchy vulvar skin and has tried over the counter terbinafine and butenafine topically. She had laser hair removal of under arms and bikini, last treatment 2012 ( 7 total treatments).     Since childhood recurrent eczemas, initially in flexural areas and recently more on hands and axillar and groin area. Axillary lesions exclusively on left side and left elbow flexural area, groin area both sides and palm of hands right>>left (thenar palm and side of fingers). Both ear canals get very itchy and flaky. Many years as well itchy scalp.  --> with treatment with Triamcinolone on hands and axilla and groins and Fluocinonide sol on scalp and ear canal much less itching (about 3x weekly).    Used in Axilla organic deodorant (Schmetz), shampoo = Heavenly essence shampoo, soap = shea moisture (coconut, Hibiscus, shea butter); collagen body lotion, Emu oil, DM Plus serum    Past Medical History:   Patient Active Problem List   Diagnosis     Anorexia     Eczema, unspecified type     Dermatitis, seborrheic     Pruritus     Neoplasm of uncertain behavior of skin     Past Medical History:   Diagnosis Date     Hypothyroid        Allergy History:     Allergies   Allergen Reactions     No Known Drug Allergies    no Asthma  Sometimes in the morning some stuffy nose, no sneezing and some red eyes (no pets)  Flexural eczema since childhood.    Social History:  The patient works as a nurse in ICU. Patient has the following hobbies or non-occupational exposure: running     reports that  has never smoked. she has never used smokeless tobacco. She reports that she does not drink alcohol or use drugs.      Family History:  Family History   Problem Relation Age of Onset     Skin Cancer Mother      Melanoma No family hx of    father has eczemas    Medications:  Current Outpatient  Medications   Medication Sig Dispense Refill     cholecalciferol (VITAMIN D3) 1000 UNIT tablet Take 1,000 Units by mouth       fluocinonide (LIDEX) 0.05 % solution Apply topically 2 times daily 60 mL 3     ketoconazole (NIZORAL) 2 % shampoo Apply topically daily as needed for itching or irritation 120 mL 11     levothyroxine (SYNTHROID, LEVOTHROID) 175 MCG tablet Take 175 mcg by mouth daily       MAGIC MOUTHWASH, ENTER INGREDIENTS IN COMMENTS, Swish and spit 5-10 mLs in mouth every 6 hours as needed Pharmacy please compound 4 grams of carafate susp in 30 ml of Benadryl (12.5 mg/5 ml), 60 ml Maalox and 30 ml Viscous Lidocaine 160 mL 0     triamcinolone (KENALOG) 0.1 % ointment Apply topically 2 times daily 454 g 3     ibuprofen (ADVIL,MOTRIN) 600 MG tablet Take 1 tablet (600 mg) by mouth every 6 hours as needed for moderate pain (Patient not taking: Reported on 11/8/2018) 30 tablet 1     ibuprofen (ADVIL,MOTRIN) 600 MG tablet Take 1 tablet (600 mg) by mouth every 6 hours as needed for moderate pain (Patient not taking: Reported on 11/8/2018) 30 tablet 1           Review of Systems:  -As per HPI  -Constitutional: The patient denies fatigue, fevers, chills, unintended weight loss, and night sweats.  -HEENT: Patient denies nonhealing oral sores.  -Skin: As above in HPI. No additional skin concerns.    Physical exam:  Vitals: There were no vitals taken for this visit.  GEN: This is a well developed, well-nourished female in no acute distress, in a pleasant mood.    SKIN: Waist-up skin, which includes the head/face, neck, both arms, chest, back, abdomen, digits and/or nails was examined.  Now only postinflammatory hyperpigmentation axillar ==> in photo from November 2018 subacute-chronic eczema (rather not fungal).   Inguinal now only hyperpigmentation   Right hand over thenar area some dry, hyperkeratotic, slightly red lesions and dry without much erythema over the sides of fingers    -No other lesions of concern on  areas examined.     Allergy Tests:    Past Allergy Test: never done    Current Allergy Test:       Order for PATCH TESTS    [x] Outpatient  [] Inpatient: [place]..../ Bed ....      Skin Atopy (atopic dermatitis) [x] Yes   [] No  Rhinitis/Sinusitis:   [x] Yes   [] No  Allergic Asthma:   [] Yes   [x] No  Food Allergy:   [] Yes   [x] No  Leg ulcers:   [] Yes   [x] No  Hand eczema:   [x] Yes   [] No   Leading hand:   [x] R   [] L       [] Ambidextrous                        Reason for tests (suspected allergy): allergic contact dermatitis to topical cosmetics/soaps/fragrances  Known previous allergies: had some reaction to the central part of fit bit    Standardized panels  [x] Standard panel (40 tests)  [x] Preservatives & Antimicrobials (31 tests)  [x] Emulsifiers & Additives (25 tests)   [x] Perfumes/Flavours & Plants (25 tests)  [] Hairdresser panel (12 tests)  [] Rubber Chemicals (22 tests)  [] Plastics (26 tests)  [] Colorants/Dyes/Food additives (20 tests)  [] Metals (implants/dental) (23 tests)  [] Local anaesthetics/NSAIDs (12 tests)  [] Antibiotics & Antimycotics (14 tests)   [] Corticosteroids (15 tests)   [] Photopatch test (32 tests)   [] others: ...      [x] Patient's own products: Shampoo, body wash and deodorant --> fit bit plastic or metal??    DO NOT test if chemical or biological identity is unknown!     always ask from patient the product information and safety sheets (MSDS)     [] Patient needs consultation with Allergy team (changes of tests may apply)  [x] Tests discussed with Allergy team (can have direct appointment for patch tests)       RESUL TS & EVALUATION of PATCH TESTS    Patch test readings after     [x] 2 days, [] 3 days [x] 4 days, [] 5 days,    Applied patch tests with results (import here the list of patch tests):  Date/time of application:01/21/19  Physician/Nurse:  / Amada Paniagua LPN               Localization of application: back (no lesions visible)     STANDARD  Series         No Substance 2 days 4 days remarks   1 Isaak Mix [C] - -    2 Colophony - -    3  2-Mercaptobenzothiazole  - -     4 Methylisothiazolinone - -    5 Carba Mix - -    6 Thiuram Mix [A] - -    7 Bisphenol A Epoxy Resin - -    8 U-Dpqn-Acnvvtbrjbm-Formaldehyde Resin - -    9 Mercapto Mix [A] (+) -    10 Black Rubber Mix- PPD [B] - -    11 Potassium Dichromate  -  -    12 Balsam of [place] (Myroxylon Pereirae Resin) - -    13 Nickel Sulphate Hexahydrate - +/++    14 Mixed Dialkyl Thiourea - -    15 Paraben Mix [B] (+) -    16 Methyldibromo Glutaronitrile - -    17 Fragrance Mix - -    18 2-Bromo-2-Nitropropane-1,3-Diol (Bronopol) - -    19 Lyral - -    20 Tixocortol-21- Pivalate - -    21 Diazolidiyl Urea (Germall II) - -    22 Methyl Methacrylate - -    23 Cobalt (II) Chloride Hexahydrate - -    24 Fragrance Mix II  - -    25 Compositae Mix - -    26 Benzoyl Peroxide - -    27 Bacitracin - -    28 Formaldehyde - -    29 Methylchloroisothiazolinone / Methylisothiazolinone - -    30 Corticosteroid Mix - -    31 Sodium Lauryl Sulfate - -    32 Lanolin Alcohol - -    33 Turpentine - -    34 Cetylstearylalcohol - -    35 Chlorhexidine Dicluconate - -    36 Budenoside - -    37 Imidazolidinyl Urea  - -    38 Ethyl-2 Cyanoacrylate - -    39 Quaternium 15 (Dowicil 200) - -    40 Decyl Glucoside - -      EMULSIFIERS & ADDITIVES        No Substance 2 days 4 days remarks   41 Polyethylene Glycol-400 - -    42 Cocamidopropyl Betaine - -    43 Amerchol L101 - -    44 Propylene Glycol - -    45 Triethanolamine - -    46 Sorbitane Sesquiolate - -    47 Isopropylmyristate - -    48 Polysorbate 80  - -    49 Amidoamine   (Stearamidopropyl Dimethylamine) - -    50 Oleamidopropyl Dimethylamine - -    51 Lauryl Glucoside - -    52 Coconut Diethanolamide  - -    53 2-Hydroxy-4-Methoxy Benzophenone (Oxybenzone) - -    54 Benzophenone-4 (Sulisobenzon) - (+) No palpable erythema; only limited to test site; irritant?   55 Propolis -  -    56 Dexpanthenol - -    57 Carboxymethyl Cellulose Sodium - -    58 Abitol - -    59 Tert-Butylhydroquinone - -    60 Benzyl Salicylate - -     Antioxidant      61 Dodecyl Gallate - -    62 Butylhydroxyanisole (BHA) - -    63 Butylhydroxytoluene (BHT) - -    64 Di-Alpha-Tocopherol (Vit E) - -    65 Propyl Gallate - -      PERFUMES, FLAVORS & PLANTS         No Substance 2 days 4 days remarks   66 Benzyl Salicylate - -    67 Benzyl Cinnamate (+) -    68 Di-Limonene (Dipentene) - -    69 Cananga Odorata (Olman Thurman) (I) - -    70 Lichen Acid Mix - -    71 Mentha Piperita Oil (Peppermint Oil) - -    72 Sesquiterpenelactone mix - -    73 Tea Tree Oil, Oxidized - -    74 Wood Tar Mix - -    75 Abietic Acid - -    76 Lavendula Angustifolia Oil (Lavender Oil) - -    77 Camphor  - -     Fragrance Mix I      78 Oakmoss Absolute - -    79 Eugenol - -    80 Geraniol - -    81 Hydroxycitronellal (+) -    82 Isoeugenol - -    83 Cinnamic Aldehyde - -    84 Cinnamic Alcohol  - -    85 Sorbitane Sesquioleate - -     Fragrance mix II      86 Citronellol - -    87 Alpha-Hexylcinnamic Aldehyde    (+) -    88 Citral - -    89 Farnesol - -    90 Coumarin - -      PRESERVATIVES & ANTIMICROBIALS         No Substance 2 days 4 days remarks   91  1,2-Benzisothiazoline-3-One, Sodium Salt - -    92  1,3,5-Sebastian (2-Hydroxyethyl) - Hexahydrotriazine (Grotan BK) - -    93 1-Frlvtktoajxtm-9-Nitro-1, 3-Propanediol - -    94  3, 4, 4' - Triclocarban - -    95 4 - Chloro - 3 - Cresol - -    96 4 - Chloro - 4 - Xylenol (PCMX) - -    97 7-Ethylbicyclooxazolidine (Bioban [id number]) - -    98 Benzalkonium Chloride - -    99 Benzyl Alcohol - -    100 Cetalkonium Chloride - -    101 Cetylpyrimidine Chloride  - -    102 Chloroacetamide - -    103 DMDM Hydantoin - -    104 Glutaraldehyde - -    105 Triclosan - -    106 Glyoxal Trimeric Dihydrate - -    107 Iodopropynyl Butylcarbamate - -    108 Octylisothiazoline - -    109 Iodoform - -    110 (Nitrobutyl)  "Morpholine/(Ethylnitro-Trimethylene) Dimorpholine (Bioban P 1487) - -    111 Phenoxyethanol - -    112 Phenyl Salicylate - -    113 Povidone Iodine - -    114 Sodium Benzoate - -    115 Sodium Disulfite - -    116 Sorbic Acid - -    117 Thimerosal - -     Parabens      118 Butyl-P-Hydroxybenzoate - -    119 Ethyl-P-Hydroxybenzoate - -    120 Methyl-P-Hydroxybenzoate - -    121 Propyl-P-Hydroxybenzoate - -      PATIENTS OWN PRODUCTS         No Substance Conc  % solv 2 days 4 days remarks   122 Moisturizing lotion  Tropical traditions        123 Charcoal and magnesium deodorant        124         125         126         127         128         129         130         131           Patients own products:  è DO NOT test if chemical or biological identity is unknown!   - always ask from patient the product information and safety sheets and consult with the physician   - check neutral pH with pH indicator paper (do not test pH below 5 or over 8 or consult with physician)  - leave-on cosmetics can be tested \"as is\"  - rinse-off products have to be diluted with water, buffer, olive oil or paraffin (discuss with physician)  à remember:   - non-specific toxic/corrosive or biological reactions can occur  - tests with patients own products are not standardized and test conditions are not optimized for patch tests. Whenever possible test with standardized test series and correlate use of product with the result of the patch tests  - if not sure if compounds can be tested under occlusion in patch tests consider open application tests        Results of patch tests:                         Interpretation:    - Negative                    A    = Allergic      (+) Erythema    TI   = Toxic/irritant   + E + Infiltration    RaP = Relevance at Present     ++ E/I + Papulovesicle   Rpr  = Relevance Previously     +++ E/I/P + Blister     nR   = No Relevance    [x] Allergic reaction diagnosed against: +/++ nickel    Interpretation/ remarks: "   Has never worn fashion jewellery and therefore had never problems. Maybe some metal piece on Fitbit might explain the reaction to Nickel. The sensibilisation against Nickel supports the diagnosis of atopic predisposition and atopic dermatitis responsible for the recurrent eczemas. They could be aggravated by toxic/irritant reactions to cosmetics/fragrances in soaps and shampoos.    [x] Patient information given   [] ACSD information   [x] SmartPractice information: Nickel    ==> final Diagnosis:    >> atopic predisposition with   - atopic dermatitis (flexural) since childhood   - maybe perennial rhinitis (HDM)   - contact sensibilisation against Nickel metals   - atopic dermatitis on hand/vulva/groins/axilla/scalp/ears) without proven sensiblisation to  additives in cosmetics (toxic/irritant reaction possible)      ==> Treatment prescribed/Plan:  > try to use in future Vanicream cream and Free&Clear Shampoo, soap and conditioner  > if necessary use corticosteroids (triamcinolone oint and Fluocinonide sol) on weekends to head but start elidel 1% cream BID on weekdays  > if still having flares, then phototherapy might be an option      Maybe later perform prick test atopy screen      RTC with Dr. Reynoso in 2-3 months      These conclusions are made at the best of ones knowledge and belief  based on the provided evidence such as patients history and allergy test results and they can change over time or can be incomplete because of missing informations.      I personally spent 20 minutes counseling the patient.       Tan Awad MD

## 2019-01-25 NOTE — PATIENT INSTRUCTIONS
always ask from patient the product information and safety sheets (MSDS)     [] Patient needs consultation with Allergy team (changes of tests may apply)  [x] Tests discussed with Allergy team (can have direct appointment for patch tests)       RESUL TS & EVALUATION of PATCH TESTS    Patch test readings after     [x] 2 days, [] 3 days [x] 4 days, [] 5 days,    Applied patch tests with results (import here the list of patch tests):  Date/time of application:01/21/19  Physician/Nurse:  / Amada Paniagua LPN               Localization of application: back (no lesions visible)     STANDARD Series         No Substance 2 days 4 days remarks   1 Isaak Mix [C] - -    2 Colophony - -    3  2-Mercaptobenzothiazole  - -     4 Methylisothiazolinone - -    5 Carba Mix - -    6 Thiuram Mix [A] - -    7 Bisphenol A Epoxy Resin - -    8 X-Afwh-Rkkzxsjlwkx-Formaldehyde Resin - -    9 Mercapto Mix [A] (+) -    10 Black Rubber Mix- PPD [B] - -    11 Potassium Dichromate  -  -    12 Balsam of [place] (Myroxylon Pereirae Resin) - -    13 Nickel Sulphate Hexahydrate - +/++    14 Mixed Dialkyl Thiourea - -    15 Paraben Mix [B] (+) -    16 Methyldibromo Glutaronitrile - -    17 Fragrance Mix - -    18 2-Bromo-2-Nitropropane-1,3-Diol (Bronopol) - -    19 Lyral - -    20 Tixocortol-21- Pivalate - -    21 Diazolidiyl Urea (Germall II) - -    22 Methyl Methacrylate - -    23 Cobalt (II) Chloride Hexahydrate - -    24 Fragrance Mix II  - -    25 Compositae Mix - -    26 Benzoyl Peroxide - -    27 Bacitracin - -    28 Formaldehyde - -    29 Methylchloroisothiazolinone / Methylisothiazolinone - -    30 Corticosteroid Mix - -    31 Sodium Lauryl Sulfate - -    32 Lanolin Alcohol - -    33 Turpentine - -    34 Cetylstearylalcohol - -    35 Chlorhexidine Dicluconate - -    36 Budenoside - -    37 Imidazolidinyl Urea  - -    38 Ethyl-2 Cyanoacrylate - -    39 Quaternium 15 (Dowicil 200) - -    40 Decyl Glucoside - -      EMULSIFIERS &  ADDITIVES        No Substance 2 days 4 days remarks   41 Polyethylene Glycol-400 - -    42 Cocamidopropyl Betaine - -    43 Amerchol L101 - -    44 Propylene Glycol - -    45 Triethanolamine - -    46 Sorbitane Sesquiolate - -    47 Isopropylmyristate - -    48 Polysorbate 80  - -    49 Amidoamine   (Stearamidopropyl Dimethylamine) - -    50 Oleamidopropyl Dimethylamine - -    51 Lauryl Glucoside - -    52 Coconut Diethanolamide  - -    53 2-Hydroxy-4-Methoxy Benzophenone (Oxybenzone) - -    54 Benzophenone-4 (Sulisobenzon) - (+) No palpable erythema; only limited to test site; irritant?   55 Propolis - -    56 Dexpanthenol - -    57 Carboxymethyl Cellulose Sodium - -    58 Abitol - -    59 Tert-Butylhydroquinone - -    60 Benzyl Salicylate - -     Antioxidant      61 Dodecyl Gallate - -    62 Butylhydroxyanisole (BHA) - -    63 Butylhydroxytoluene (BHT) - -    64 Di-Alpha-Tocopherol (Vit E) - -    65 Propyl Gallate - -      PERFUMES, FLAVORS & PLANTS         No Substance 2 days 4 days remarks   66 Benzyl Salicylate - -    67 Benzyl Cinnamate (+) -    68 Di-Limonene (Dipentene) - -    69 Cananga Odorata (Olman Thurman) (I) - -    70 Lichen Acid Mix - -    71 Mentha Piperita Oil (Peppermint Oil) - -    72 Sesquiterpenelactone mix - -    73 Tea Tree Oil, Oxidized - -    74 Wood Tar Mix - -    75 Abietic Acid - -    76 Lavendula Angustifolia Oil (Lavender Oil) - -    77 Camphor  - -     Fragrance Mix I      78 Oakmoss Absolute - -    79 Eugenol - -    80 Geraniol - -    81 Hydroxycitronellal (+) -    82 Isoeugenol - -    83 Cinnamic Aldehyde - -    84 Cinnamic Alcohol  - -    85 Sorbitane Sesquioleate - -     Fragrance mix II      86 Citronellol - -    87 Alpha-Hexylcinnamic Aldehyde    (+) -    88 Citral - -    89 Farnesol - -    90 Coumarin - -      PRESERVATIVES & ANTIMICROBIALS         No Substance 2 days 4 days remarks   91  1,2-Benzisothiazoline-3-One, Sodium Salt - -    92  1,3,5-Sebastian (2-Hydroxyethyl) -  "Hexahydrotriazine (Grotan BK) - -    93 8-Mwikadeaohrfu-2-Nitro-1, 3-Propanediol - -    94  3, 4, 4' - Triclocarban - -    95 4 - Chloro - 3 - Cresol - -    96 4 - Chloro - 4 - Xylenol (PCMX) - -    97 7-Ethylbicyclooxazolidine (Bioban [id number]) - -    98 Benzalkonium Chloride - -    99 Benzyl Alcohol - -    100 Cetalkonium Chloride - -    101 Cetylpyrimidine Chloride  - -    102 Chloroacetamide - -    103 DMDM Hydantoin - -    104 Glutaraldehyde - -    105 Triclosan - -    106 Glyoxal Trimeric Dihydrate - -    107 Iodopropynyl Butylcarbamate - -    108 Octylisothiazoline - -    109 Iodoform - -    110 (Nitrobutyl) Morpholine/(Ethylnitro-Trimethylene) Dimorpholine (Bioban P 1487) - -    111 Phenoxyethanol - -    112 Phenyl Salicylate - -    113 Povidone Iodine - -    114 Sodium Benzoate - -    115 Sodium Disulfite - -    116 Sorbic Acid - -    117 Thimerosal - -     Parabens      118 Butyl-P-Hydroxybenzoate - -    119 Ethyl-P-Hydroxybenzoate - -    120 Methyl-P-Hydroxybenzoate - -    121 Propyl-P-Hydroxybenzoate - -      PATIENTS OWN PRODUCTS         No Substance Conc  % solv 2 days 4 days remarks   122 Moisturizing lotion  Tropical traditions        123 Charcoal and magnesium deodorant        124         125         126         127         128         129         130         131           Patients own products:  è DO NOT test if chemical or biological identity is unknown!   - always ask from patient the product information and safety sheets and consult with the physician   - check neutral pH with pH indicator paper (do not test pH below 5 or over 8 or consult with physician)  - leave-on cosmetics can be tested \"as is\"  - rinse-off products have to be diluted with water, buffer, olive oil or paraffin (discuss with physician)  à remember:   - non-specific toxic/corrosive or biological reactions can occur  - tests with patients own products are not standardized and test conditions are not optimized for patch tests. " Whenever possible test with standardized test series and correlate use of product with the result of the patch tests  - if not sure if compounds can be tested under occlusion in patch tests consider open application tests        Results of patch tests:                         Interpretation:    - Negative                    A    = Allergic      (+) Erythema    TI   = Toxic/irritant   + E + Infiltration    RaP = Relevance at Present     ++ E/I + Papulovesicle   Rpr  = Relevance Previously     +++ E/I/P + Blister     nR   = No Relevance    [x] Allergic reaction diagnosed against: +/++ nickel    Interpretation/ remarks:   Has never worn fashion jewellery and therefore had never problems. Maybe some metal piece on Fitbit might explain the reaction to Nickel. The sensibilisation against Nickel supports the diagnosis of atopic predisposition and atopic dermatitis responsible for the recurrent eczemas. They could be aggravated by toxic/irritant reactions to cosmetics/fragrances in soaps and shampoos.    [x] Patient information given   [] ACSD information   [x] SmartPractice information: Nickel    ==> final Diagnosis:    >> atopic predisposition with   - atopic dermatitis (flexural) since childhood   - maybe perennial rhinitis (HDM)   - contact sensibilisation against Nickel metals   - atopic dermatitis on hand/vulva/groins/axilla/scalp/ears) without proven sensiblisation to  additives in cosmetics (toxic/irritant reaction possible)      ==> Treatment prescribed/Plan:  > try to use in future Vanicream cream and Free&Clear Shampoo, soap and conditioner  > if necessary use corticosteroids (triamcinolone oint and Fluocinonide sol) on weekends to head but start elidel 1% cream BID on weekdays  > if still having flares, then phototherapy might be an option      These conclusions are made at the best of ones knowledge and belief  based on the provided evidence such as patients history and allergy test results and they can change  over time or can be incomplete because of missing informations.        Maybe later perform prick test atopy screen

## 2019-02-01 NOTE — PATIENT INSTRUCTIONS
Patch Testing Information  What are allergen patch tests?    The test is done to look for skin allergies that may be causing rashes and irritation.    A patch test is a way of identifying whether a substance has caused a delayed reaction with skin inflammation, such as contact eczema or delayed (after days) reactions to drugs.     We will use various types of test compounds, which may include common allergens you may come in contact with in daily life such as preservatives, fragrances or even drugs.     Most of the time we will use standardized, prefabricated test solutions. The choice of the substances and series tested will depend on your history of reactions. Sometimes we will test your own products as well.     In order to avoid severe toxic reactions we need detailed information or safety sheets for each of the test compounds.    The test panels are set up with a small amount of common substances that cause skin allergies. They are taped to your skin with a clear hypoallergenic bandage and reinforced hypoallergenic tape.    The substances are numbered, so it is easy to tell what is causing a skin reaction.  What do I need to do in preparation for the test?    Stop all systemic steroids 1 month prior to the testing.     Stop applying topical steroids to the test area one week prior to the test. It is to use them elsewhere throughout the testing process. If this is not possible then discuss options with the Allergy specialist.    Do not go sunbathing or tanning for one week prior to testing    It is okay to take antihistamines as normal.     Please wear dark colors the week of the test since we will write on you with a dark marker that may transfer and stain clothing or bedding.     Some medications can affect the reactions to allergens during the tests. Therefore reveal all the medications you take to the allergy team. The doctor will discuss the medications with you before the tests.     Eat how you normally  would.    Avoid the following:    You cannot get the test area wet during the entire test period. This means no bathing or swimming the entire test period.    No strenuous exercise that may cause sweating.    Do not scratch the test area, this can cause the allergen to spread and give us false positives.     Avoid exposure to UV irradiation. This means no tanning or UV treatment during the testing period.   What can I expect?    Patch testing is done over three appointments.   o The usual schedule is: Monday (Allergen patches are placed), Wednesday (Patches are removed and skin is examined by the MD), and Friday (Skin is examined by the MD)      If you are allergic, there will be an area of irritation where the test was placed.    Itching or burning at the test site might happen if you are allergic to the allergen.  Do not rub or scratch the test site since this may spread the allergen and possibly cause false positives. If itching or burning is not tolerable please contact the clinic.    The marker we draw on your back with ma take up to 5 weeks to wash off completely. Rubbing alcohol can help speed up this process.     Reactions can occur 1 to 2 weeks after the tests are applied. If this happens please take photos of the area and contact the clinic.  What should I do after the tests are placed?    Keep the area dry. No showering or getting the test area wet from the time we see you at your first visit until after your third appointment. If you get the test area wet you are washing off the test and we could get false negative reactions.    If you notice any of the test patches coming loose put on more tape to re-secure the test.    If the marker that is applied fades you can use a dark pen to tatyana around the panel sites.    Cover the test area when you are outside to avoid any sun exposure while the test is in place.     You can remove the tests only if there is a severe reaction (itch, pain, blistering). Please  report this to your doctor immediately. If you have to remove the tests please tatyana the locations of each test field with a grid so we can identify the allergen.  WHAT ARE THE POSSIBLE RISKS OF PATCH TESTS     If you are allergic to a compound tested you will develop a localized skin reaction similar to your previous reaction, this may take days to develop. These reactions include a formation of red, itchy skin lesions that could be about a centimeter with small vesicles or possibly even blisters. The lesions will fade over time, this may take weeks. The test might leave some skin pigmentation for a few months.     In rare cases a localized reaction to patch testing can become generalized.     The tests with your own products might have some risks because they are not standardized and unanticipated reactions could occur. We need as much information as possible to evaluate if your own products are safe to test and under what conditions. This has to be evaluated for each individual product.   Useful Contact Information    To contact your doctor you can either send a [employer] message or call [phone]     Address  o [address] [place] [address]    If you develop any serious or adverse allergic reaction after office hours please seek immediate medical assistance at the nearest clinic, urgent care, or emergency room.

## 2019-02-01 NOTE — PROGRESS NOTES
Patient had 123 patch tests placed this visit.    Standard panel (40 tests)    Preservatives & Antimicrobials (31 tests)    Emulsifiers & Additives (25 tests)     Perfumes/Flavours & Plants (25 tests)    Personal Products (2 tests)

## 2019-02-13 ENCOUNTER — DOCUMENTATION ONLY (OUTPATIENT)
Dept: CARE COORDINATION | Facility: CLINIC | Age: 39
End: 2019-02-13

## 2019-02-14 ENCOUNTER — VIRTUAL VISIT (OUTPATIENT)
Dept: FAMILY MEDICINE | Facility: OTHER | Age: 39
End: 2019-02-14

## 2019-02-14 NOTE — PROGRESS NOTES
"Date:   Clinician: Atif Hooks  Clinician NPI: [id number]  Patient: Lise Ronquillo  Patient : 1980  Patient Address: [address], [place] [address]  Patient Phone: [phone]  Visit Protocol: UTI  Patient Summary:  Lise is a 39 year old ( : 1980 ) female who initiated a Visit for a presumed bladder infection. When asked the question \"Please sign me up to receive news, health information and promotions. \", Lise responded \"No\".   A synchronous visit is necessary because the patient reported the following abnormal symptoms:   Hematuria and not menstruating (requires clarification)   Her symptoms started 4-6 days ago and consist of urinary frequency, feeling as if the bladder is never empty, urgency, foul-smelling urine, and dysuria.   Symptom details   Urine color: The color of her urine is pink or red. She is not currently menstruating.    Denied symptoms include urinary incontinence, vaginal discharge, abdominal pain, vomiting, flank pain, chills, vaginal itching, and nausea. She does not feel feverish.   Lise has not used any over-the-counter medications or home remedies to relieve her current symptoms.  Precipitating events  Lise denies having a sexually transmitted disease.  Pertinent medical history  Lise does not get yeast infections when she takes antibiotics. She has not experienced problems or side effects with any of the common antibiotics used to treat bladder infections.   Lise does not have a history of bladder infections or kidney stones. She has not used a catheter or been a patient in a hospital or nursing home in the past 2 weeks.   Lise does not smoke or use smokeless tobacco.   She denies pregnancy and denies breastfeeding. She has menstruated in the past month.   MEDICATIONS: levothyroxine oral, ALLERGIES: NKDA  Clinician Response:  Dear Lise,  Based on the information you have provided, you likely " have an acute urinary tract infection, also called a bladder infection. Bladder infections occur when bacteria from the outside of the body enters the urinary tract. Any part of the urinary system can be infected, but the bladder is the most common.  Medication information  I am prescribing:     Nitrofurantoin monohyd/m-cryst (Macrobid) 100 mg oral capsule. Take 1 capsule by mouth every 12 hours for 5 days. Take this medication with food. There are no refills with this prescription.   The medication I prescribed for your bladder infection is an antibiotic. Continue taking the medication until it is gone even if you feel better.   Yeast infections can be a common side effect of antibiotics. The most common symptom of a yeast infection is itchiness in and around the vagina. Other signs and symptoms include burning, redness, or a thick, white vaginal discharge that looks like cottage cheese and does not have a bad smell.  Unless you are allergic to the following over-the-counter medication, I recommend:     Phenazopyridine (AZO, Uristat, or store brand) oral tablet to treat your discomfort with urination. Swallow 2 tablets 3 times a day for up to 2 days. Take the tablets with a full glass of water after a meal.   This medication helps to relieve symptoms of a bladder infection such as pain, burning, and the sudden urge to urinate, but will not cure the infection. The color of your urine will likely turn an orange color and can permanently stain clothing it comes into contact with.  Soft contact lenses can also be permanently stained and should not be worn while taking this medication. If you must wear your contacts, wash your hands after handling the medication.  Stop using this medication immediately and be seen in a clinic or urgent care if your skin or the whites of your eyes appear yellowish in color.  Over-the-counter medications do not require a prescription. Ask the pharmacist if you have any questions.  Self  care  Urination helps to flush bacteria from the urinary tract. For this reason, drinking water and urinating often helps relieve some symptoms of a bladder infection and can decrease your risk of getting bladder infections in the future.  Other steps you can take to prevent future bladder infections include:     Wipe front to back after using the bathroom    Urinate after sexual intercourse    Avoid using deodorant sprays, douches, or powders in the vaginal area     When to seek care  Please make an appointment to be seen in a clinic or urgent care if any of the following occur:     You develop new symptoms or your symptoms become worse    You have medication side effects that make it difficult to take them as prescribed    Your symptoms do not improve within 1-2 days of starting treatment    You have symptoms of a bladder infection that return shortly after completing treatment     It is possible to have an allergic reaction to an antibiotic even if you have not had one in the past. If you notice a new rash, significant swelling, or difficulty breathing, stop taking this medication immediately and go to a clinic or urgent care.   Diagnosis: Acute uncomplicated bladder infection  Diagnosis ICD: N39.0  Triage Notes: Patient not experiencing hematuria.  Verified name and   Synchronous Triage: phone, status: completed, duration: 135 seconds  Prescription: nitrofurantoin monohyd/m-cryst (Macrobid) 100 mg oral capsule 10 capsule, 5 days supply. Take 1 capsule by mouth every 12 hours for 5 days. Refills: 0, Refill as needed: no, Allow substitutions: yes  Pharmacy: [id number] - [phone] - [address] [place] [address]

## 2019-02-21 ENCOUNTER — OFFICE VISIT (OUTPATIENT)
Dept: DERMATOLOGY | Facility: CLINIC | Age: 39
End: 2019-02-21
Payer: COMMERCIAL

## 2019-02-21 DIAGNOSIS — L20.84 INTRINSIC ECZEMA: Primary | ICD-10-CM

## 2019-02-21 DIAGNOSIS — L30.9 CHRONIC DERMATITIS OF HANDS: ICD-10-CM

## 2019-02-21 DIAGNOSIS — L21.9 DERMATITIS, SEBORRHEIC: ICD-10-CM

## 2019-02-21 RX ORDER — BETAMETHASONE DIPROPIONATE 0.5 MG/G
OINTMENT, AUGMENTED TOPICAL 2 TIMES DAILY
Qty: 45 G | Refills: 3 | Status: SHIPPED | OUTPATIENT
Start: 2019-02-21 | End: 2020-02-21

## 2019-02-21 RX ORDER — HYDROCORTISONE 2.5 %
CREAM (GRAM) TOPICAL 2 TIMES DAILY
Qty: 30 G | Refills: 3 | Status: SHIPPED | OUTPATIENT
Start: 2019-02-21 | End: 2020-02-21

## 2019-02-21 ASSESSMENT — PAIN SCALES - GENERAL: PAINLEVEL: NO PAIN (0)

## 2019-02-21 NOTE — NURSING NOTE
"Chief Complaint   Patient presents with     Derm Problem     Blanka is here to be seen for \"itchy patches of skin\" and notes \"new spots on neck\"     Adrien Larose, EMANUEL    "

## 2019-02-21 NOTE — LETTER
RE: Lise Ronquillo  [address]  [place] [address]     Dear Colleague,    Thank you for referring your patient, Lise Ronquillo, to the [facility] DERMATOLOGY at [facility]. Please see a copy of my visit note below.    [facility] Dermatology Note      Dermatology Problem List:  1. Benign nevi, s/p biopsy, outside facility, ~ 2007  2. Atopic dermatitis  - involving antecubital fossae, vulvar skin, triamcinolone ointment BID to trunk and body   - lab evaluation (11/8/18): CBC WNL, low TSH, iron studies WNL, IgE WNL, ferritin elevated  - patch testing done 1/20/19      +/++ nickel sulfate     ?irritant to benzophenone-4 (patch note 1/25/19 with Dr. Awad)  3. Seborrheic dermatitis, fluocinonide solution and ketoconazole shampoo  4. Dysplastic nevus with mild atypia, left medial thigh, s/p shave biopsy 11/8/18    Encounter Date: Feb 21, 2019    CC:   Chief Complaint   Patient presents with     Derm Problem     Blanka is here to be seen for itch     History of Present Illness:  Ms. Lise Ronquillo is a 39 year old female who presents as a follow-up for atopic dermatitis. The patient was last seen by Dr. Awad 1/25/19 when she completed patch testing which showed nickel sulfate +/++ reaction with questionable irritant reaction to benzophenone-4. She states that triamcinolone ointment twice daily as needed to trunk and body has been extremely helpful and she is very happy with the results of this. She has noticed some increased irritation and dryness of the hands/palms in particular and still has some scaling of the scalp. She does have some flaking of the eyelashes, but has not been treating this areas. The patient is well today in their baseline state of health, with no additional skin concerns.      Past Medical History:   Patient Active Problem List   Diagnosis     Anorexia     Eczema, unspecified type     Dermatitis,  seborrheic     Pruritus     Neoplasm of uncertain behavior of skin     Past Medical History:   Diagnosis Date     Hypothyroid      History reviewed. No pertinent surgical history.    Social History:  Patient reports that  has never smoked. she has never used smokeless tobacco. She reports that she does not drink alcohol or use drugs.    Family History:  Family History   Problem Relation Age of Onset     Skin Cancer Mother      Melanoma No family hx of        Medications:  Current Outpatient Medications   Medication Sig Dispense Refill     cholecalciferol (VITAMIN D3) 1000 UNIT tablet Take 1,000 Units by mouth       fluocinonide (LIDEX) 0.05 % solution Apply topically 2 times daily 60 mL 3     ketoconazole (NIZORAL) 2 % shampoo Apply topically daily as needed for itching or irritation 120 mL 11     levothyroxine (SYNTHROID, LEVOTHROID) 175 MCG tablet Take 175 mcg by mouth daily       triamcinolone (KENALOG) 0.1 % ointment Apply topically 2 times daily 454 g 3     ibuprofen (ADVIL,MOTRIN) 600 MG tablet Take 1 tablet (600 mg) by mouth every 6 hours as needed for moderate pain (Patient not taking: Reported on 11/8/2018) 30 tablet 1     ibuprofen (ADVIL,MOTRIN) 600 MG tablet Take 1 tablet (600 mg) by mouth every 6 hours as needed for moderate pain (Patient not taking: Reported on 11/8/2018) 30 tablet 1     MAGIC MOUTHWASH, ENTER INGREDIENTS IN COMMENTS, Swish and spit 5-10 mLs in mouth every 6 hours as needed Pharmacy please compound 4 grams of carafate susp in 30 ml of Benadryl (12.5 mg/5 ml), 60 ml Maalox and 30 ml Viscous Lidocaine (Patient not taking: Reported on 2/21/2019) 160 mL 0     pimecrolimus (ELIDEL) 1 % external cream Apply topically 2 times daily as needed (itchiness) To itchy areas of head and face (Patient not taking: Reported on 2/21/2019) 60 g 3     Allergies   Allergen Reactions     No Known Drug Allergies      Physical exam:  Vitals: There were no vitals taken for this visit.  GEN: This is a well  developed, well-nourished female in no acute distress, in a pleasant mood.    SKIN: Full skin, which includes the head/face, both arms, chest, back, abdomen,both legs, genitalia and/or groin buttocks, digits and/or nails, was examined.  -There are pink scaly patches and plaques on the right lateral neck.  -Dry hands with fissuring on the palms bilaterally.  -There is macular erythema of the scalp and eyelashes with mild flaky white scale.  -No other lesions of concern on areas examined.     Impression/Plan:  1. Seborrheic dermatitis    Continue to have her apply ketoconazole shampoo 2-3X per week to the scalp in the shower, leave on for 5 minutes, then wash out alternating with Fluocinonide solution 2-3 X weekly    2. Eczematous dermatitis    For the eyelids: OK to use ketoconazole shampoo, can follow with up to twice daily use of 2.5% hydrocortisone cream (if using more than 1/2 the days of the month then we should look at other options).\    For the right neck: triamcinolone ointment twice daily until gone or for two weeks (either is fine)    For the body: triamcinolone ointment twice daily as needed    For the hands: betamethasone ointment twice daily for two weeks or until resolved (and then twice daily as needed).     For the groin: the 2.5% hydrocortisone is a safe choice up to twice daily (including buttocks)      CC Referred Self, MD  No address on file on close of this encounter.  Follow-up in 3 months, earlier for new or changing lesions.      staffed the patient.    Staff Involved:  Resident(Donna Reynoso)/Staff  .I, Lynda Obrien MD, saw this patient with the resident and agree with the resident s findings and plan of care as documented in the resident s note.    Again, thank you for allowing me to participate in the care of your patient.      Sincerely,    Donna Reynoso MD

## 2019-02-21 NOTE — PROGRESS NOTES
[facility] Dermatology Note      Dermatology Problem List:  1. Benign nevi, s/p biopsy, outside facility, ~ 2007  2. Atopic dermatitis  - involving antecubital fossae, vulvar skin, triamcinolone ointment BID to trunk and body   - lab evaluation (11/8/18): CBC WNL, low TSH, iron studies WNL, IgE WNL, ferritin elevated  - patch testing done 1/20/19      +/++ nickel sulfate     ?irritant to benzophenone-4 (patch note 1/25/19 with Dr. Awad)  3. Seborrheic dermatitis, fluocinonide solution and ketoconazole shampoo  4. Dysplastic nevus with mild atypia, left medial thigh, s/p shave biopsy 11/8/18    Encounter Date: Feb 21, 2019    CC:   Chief Complaint   Patient presents with     Derm Problem     Blanka is here to be seen for itch     History of Present Illness:  Ms. Lise Ronquillo is a 39 year old female who presents as a follow-up for atopic dermatitis. The patient was last seen by Dr. Awad 1/25/19 when she completed patch testing which showed nickel sulfate +/++ reaction with questionable irritant reaction to benzophenone-4. She states that triamcinolone ointment twice daily as needed to trunk and body has been extremely helpful and she is very happy with the results of this. She has noticed some increased irritation and dryness of the hands/palms in particular and still has some scaling of the scalp. She does have some flaking of the eyelashes, but has not been treating this areas. The patient is well today in their baseline state of health, with no additional skin concerns.      Past Medical History:   Patient Active Problem List   Diagnosis     Anorexia     Eczema, unspecified type     Dermatitis, seborrheic     Pruritus     Neoplasm of uncertain behavior of skin     Past Medical History:   Diagnosis Date     Hypothyroid      History reviewed. No pertinent surgical history.    Social History:  Patient reports that  has never smoked. she has never used smokeless tobacco. She  reports that she does not drink alcohol or use drugs.    Family History:  Family History   Problem Relation Age of Onset     Skin Cancer Mother      Melanoma No family hx of        Medications:  Current Outpatient Medications   Medication Sig Dispense Refill     cholecalciferol (VITAMIN D3) 1000 UNIT tablet Take 1,000 Units by mouth       fluocinonide (LIDEX) 0.05 % solution Apply topically 2 times daily 60 mL 3     ketoconazole (NIZORAL) 2 % shampoo Apply topically daily as needed for itching or irritation 120 mL 11     levothyroxine (SYNTHROID, LEVOTHROID) 175 MCG tablet Take 175 mcg by mouth daily       triamcinolone (KENALOG) 0.1 % ointment Apply topically 2 times daily 454 g 3     ibuprofen (ADVIL,MOTRIN) 600 MG tablet Take 1 tablet (600 mg) by mouth every 6 hours as needed for moderate pain (Patient not taking: Reported on 11/8/2018) 30 tablet 1     ibuprofen (ADVIL,MOTRIN) 600 MG tablet Take 1 tablet (600 mg) by mouth every 6 hours as needed for moderate pain (Patient not taking: Reported on 11/8/2018) 30 tablet 1     MAGIC MOUTHWASH, ENTER INGREDIENTS IN COMMENTS, Swish and spit 5-10 mLs in mouth every 6 hours as needed Pharmacy please compound 4 grams of carafate susp in 30 ml of Benadryl (12.5 mg/5 ml), 60 ml Maalox and 30 ml Viscous Lidocaine (Patient not taking: Reported on 2/21/2019) 160 mL 0     pimecrolimus (ELIDEL) 1 % external cream Apply topically 2 times daily as needed (itchiness) To itchy areas of head and face (Patient not taking: Reported on 2/21/2019) 60 g 3        Allergies   Allergen Reactions     No Known Drug Allergies          Review of Systems:  -As per HPI  -Constitutional: Otherwise feeling well today, in usual state of health.  -Skin: As above in HPI. No additional skin concerns.    Physical exam:  Vitals: There were no vitals taken for this visit.  GEN: This is a well developed, well-nourished female in no acute distress, in a pleasant mood.    SKIN: Full skin, which includes the  head/face, both arms, chest, back, abdomen,both legs, genitalia and/or groin buttocks, digits and/or nails, was examined.  -There are pink scaly patches and plaques on the right lateral neck.  -Dry hands with fissuring on the palms bilaterally.  -There is macular erythema of the scalp and eyelashes with mild flaky white scale.  -No other lesions of concern on areas examined.     Impression/Plan:  1. Seborrheic dermatitis    Continue to have her apply ketoconazole shampoo 2-3X per week to the scalp in the shower, leave on for 5 minutes, then wash out alternating with Fluocinonide solution 2-3 X weekly    2. Eczematous dermatitis    For the eyelids: OK to use ketoconazole shampoo, can follow with up to twice daily use of 2.5% hydrocortisone cream (if using more than 1/2 the days of the month then we should look at other options).\    For the right neck: triamcinolone ointment twice daily until gone or for two weeks (either is fine)    For the body: triamcinolone ointment twice daily as needed    For the hands: betamethasone ointment twice daily for two weeks or until resolved (and then twice daily as needed).     For the groin: the 2.5% hydrocortisone is a safe choice up to twice daily (including buttocks)      CC Referred Self, MD  No address on file on close of this encounter.  Follow-up in 3 months, earlier for new or changing lesions.        staffed the patient.    Staff Involved:  Resident(Donna Reynoso)/Staff  .I, Lynda Obrien MD, saw this patient with the resident and agree with the resident s findings and plan of care as documented in the resident s note.

## 2019-02-21 NOTE — PATIENT INSTRUCTIONS
Great to see you today!    We recommend the following for your skin today:    - for your scalp: diligent use of fluocinonide solution alternating with ketoconazole 2% shampoo daily or every other day (daily is fine until things are calmed down)    - for your eyelids: OK to use ketoconazole shampoo, can follow with up to twice daily use of 2.5% hydrocortisone cream (if using more than 1/2 the days of the month then we should look at other options).    - for your right neck: triamcinolone ointment twice daily until gone or for two weeks (either is fine)    - for your body: triamcinolone ointment twice daily as needed    - for your hands: betamethasone ointment twice daily for two weeks or until resolved (and then twice daily as needed).     - for groin folds: the 2.5% hydrocortisone is a safe choice up to twice daily (including buttocks)

## 2019-02-27 ENCOUNTER — OFFICE VISIT (OUTPATIENT)
Dept: ENDOCRINOLOGY | Facility: CLINIC | Age: 39
End: 2019-02-27
Payer: COMMERCIAL

## 2019-02-27 VITALS
DIASTOLIC BLOOD PRESSURE: 71 MMHG | HEIGHT: 65 IN | BODY MASS INDEX: 19.58 KG/M2 | SYSTOLIC BLOOD PRESSURE: 111 MMHG | WEIGHT: 117.5 LBS | HEART RATE: 60 BPM

## 2019-02-27 DIAGNOSIS — Z80.8 FAMILY HISTORY OF THYROID CANCER: ICD-10-CM

## 2019-02-27 DIAGNOSIS — E04.1 THYROID NODULE: ICD-10-CM

## 2019-02-27 DIAGNOSIS — E03.9 HYPOTHYROIDISM, UNSPECIFIED TYPE: Primary | ICD-10-CM

## 2019-02-27 DIAGNOSIS — E03.9 HYPOTHYROIDISM, UNSPECIFIED TYPE: ICD-10-CM

## 2019-02-27 DIAGNOSIS — Z72.821 POOR SLEEP HYGIENE: ICD-10-CM

## 2019-02-27 LAB
T4 FREE SERPL-MCNC: 1.24 NG/DL (ref 0.76–1.46)
TSH SERPL DL<=0.005 MIU/L-ACNC: 0.85 MU/L (ref 0.4–4)

## 2019-02-27 RX ORDER — LEVOTHYROXINE SODIUM 175 UG/1
175 TABLET ORAL DAILY
Qty: 90 TABLET | Refills: 3 | Status: SHIPPED | OUTPATIENT
Start: 2019-02-27 | End: 2020-05-05

## 2019-02-27 ASSESSMENT — PATIENT HEALTH QUESTIONNAIRE - PHQ9: SUM OF ALL RESPONSES TO PHQ QUESTIONS 1-9: 8

## 2019-02-27 ASSESSMENT — MIFFLIN-ST. JEOR: SCORE: 1213.86

## 2019-02-27 ASSESSMENT — PAIN SCALES - GENERAL: PAINLEVEL: NO PAIN (0)

## 2019-02-27 NOTE — LETTER
RE: Lise Ronquillo  [address] [place] [address]     Dear Colleague,    Thank you for referring your patient, Lise Ronquillo, to the [facility] ENDOCRINOLOGY at [facility]. Please see a copy of my visit note below.    Endocrine Consult note    Attending Assessment/Plan :     1.  Central hypothyroidism by history.  I appears  this was more related to her past active eating disorder and is unlikely to be permanent. She is currently on LT4 175 mcg/day. She may need a dose reduction.   She does not appear thyrotoxic.  Labs following the appt show biochemical euthyroidism on the current dose.     2.  Right thyroid nodule 3.5 cm in the past.  It feels much smaller on exam.   Repeat US following the appt shows the nodule has increased in size 93% since 2002, indicating it is growing very slowly.  The 2002 thyroid scan raised question that this is a hot nodule.  I am unable to see the actual images from 2002 on PACS.  Her TFTS do not suggest the presence of a hot nodule at least not to the point of thyrotoxicosis .       Weight loss.  Eating disorder has been treated multiple times. She says it is currently not active but her weight is down significantly from the past.      Night shift work.  Poor sleep hygiene.     Family history of thyroid cancer in mother and sister- Because of this history we should be more attentive to # 2.     Kaela Ellsworth MD      Chief complaint:  Lise is a 39 year old female seen in consultation at the request of Dr Donna Reynoso for hyperthyroidism.  I have reviewed Care Everywhere including [facility] lab reports, imaging reports and provider notes as indicated.      HISTORY OF PRESENT ILLNESS   There is a family history of thyroid cancer in her mother and her sister, type unknown.  In approximately 1999 as a senior in  she was noted to have a visible thyroid nodule.  I can see on the record she had FNAB in 2000 and  "then agin in 2011 both times with benign cytology    Lise  has previously been under the care of another endocrinologist, Dr Eva Garcia,whom she was seen by 8/8/11- 1/26/17. When she lst presented to Dr Garcia she was already on LT4 88 mcg/day.  The diagnosis of ? central hypothyroidism was made by Dr Chin - she doesn't recall the circumstances. She says it was because of the thyroid nodule.  I don't have any records from Dr Chin.  She believes the thyroid Nodule shrunk on the lT4 - still tehre but less visible  She has been on the current LT4 dose x years.  This is the same dose as she was on at the time of the last visit with Dr Shayla Salas states that Menarche was age 18.  The eating disorder (Anorexia nervosa) was diagnosed when she was in college. It has been associated with amenorrhea, low BMD, and HR down to 33 /minute.  She has been in many different treatment programs over time, including [facility], [facility], [facility] [facility], [facility]. She says that she gained weight to get her periods back, which worked.  She has been off the OCP x years. She says she is currently having roughly monthly menses.  She says that the current weight was achieved by exercise and if she \"didn't care\".  However, she says she is not over exercising, no longer running like she used to.      2/15/00 thyroid nodule cytology   8/11/01: TSH 0.94, total T4 10.2  10/13/01 TSH 0.24, total T4 12.7  12/18/02: TSH 0.83, free T4 0.96, free T3 1.7  5/2/03: TSH 2.85,  T3 129, free t4 0.8  10/31/04: TSH 1.43, free T4 0.9, Free T3 2.0  6/29/11: TSH 2.13, TSH 1.82  7/8/11: TSH 3.2, free T4 0.9, free T3 1.6 - this was basis for Shayla diagnosis secondary hypothyroidsim while she was inpatient at [facility] for anorexia nervosa  8/19/11: TSH 2.69, prolactin 3.5, LH < 1, FSH 2.2, estradiol 11, cortisol 18.2  10/6/11 thyroid nodule " cytology:   10/12/11: TSH 0.2, free T4 1.0, free T3 2.4, estradiol 15  10/13/11 : leptin 1.7 ng/dml (3.9-30 ng/ml)  11/18/11: leptin 4.4   12/28/11 TSH 0.2  1/19/12: TSH 2.14, free T4 0.8, free T3 2.0 on LT4 50 mcg/day ; outpatient treatment at [facility]; LT4 dose increased to 75 mcg/day  2/25/12: TSH 0.23, free T4 1.0, free T3 1.0  7/16/12:   1/25/13: TSH < 0.03, free T4 1.5, free T3 2.5  8/20/13: TSH 3.35, free t4 0.6, free t3 1.6  4/25/14: TSH < 0.03, free T4 1.0, free T3 2.4, 25 OHD 8, Ca 9.1  1/9/15: TSH 0.59, free T4 0.7, free T3 1.5  3/9/15: TSH 0.04, free T4 1.0, free T3 2.0  1/26/17: TSH < 0.02, cortisol 10, free T4 1.4, free T3 2.6, FSH 4.2, prolactin 8.2, LH 4  11/8/18: TSH 0.09, free T4 1.97 -     Weights   1/26/17 weight 138    Imaging  I can see on PACS that she had thyroid US 12/19/02 and thyroid uptake/scan 12/29/02-in the [facility] system but I can't see images   12/19/02 thyroid US: Solid nodule right inferior 2.5 x 1.3 x 1.6 cm -sonographically indeterminate  12/20/02 123I thyroid uptake/scan: 24 hour uptake 11%; suggestion of slightly increased focal uptake right lobe which  could represent a hot nodule.   8/19/11 thyroid US ([facility]) - right thyroid nodule 3.5 cm, heterogeneous - FNAB 8/18/11 insufficient; FNAB 8/18/11 benign   8/25/11 MRI pituitary  : 2 mm focus decreased enhancement interposed between adenohypophysis and neurohypophysis in the midline .  10/6/11 FNAB thyroid nodule right  5/22/12 thyroid US: right nodule 3.1 x 1.6 x 1.6 cm   3/4/19 thyroid US (followed appt, not discussed at appt)- as read by me: right thyroid nodule 1.9 x 1.6 x 3.3, heterogeneous with somewhat irregular border . Shadowing calcification. Grade 3 blood flow.    Left nodule 0.3 cm    Topical steroid use for treatment of atopic dermatitis    Past Medical History  Past Medical History:   Diagnosis Date     Central hypothyroidism      Eating disorder      Eczematous dermatitis      Hypothalamic amenorrhea       OCD (obsessive compulsive disorder)      Right thyroid nodule      Seborrheic dermatitis      No past surgical history on file.      Medications    Current Outpatient Medications   Medication Sig Dispense Refill     cholecalciferol (VITAMIN D3) 1000 UNIT tablet Take 5,000 Units by mouth        fluocinonide (LIDEX) 0.05 % solution Apply topically 2 times daily 60 mL 3     hydrocortisone 2.5 % cream Apply topically 2 times daily 30 g 3     ketoconazole (NIZORAL) 2 % shampoo Apply topically daily as needed for itching or irritation 120 mL 11     levothyroxine (SYNTHROID, LEVOTHROID) 175 MCG tablet Take 175 mcg by mouth daily       triamcinolone (KENALOG) 0.1 % ointment Apply topically 2 times daily 454 g 3     augmented betamethasone dipropionate (DIPROLENE-AF) 0.05 % external ointment Apply topically 2 times daily (Patient not taking: Reported on 2/27/2019) 45 g 3     ibuprofen (ADVIL,MOTRIN) 600 MG tablet Take 1 tablet (600 mg) by mouth every 6 hours as needed for moderate pain (Patient not taking: Reported on 11/8/2018) 30 tablet 1     ibuprofen (ADVIL,MOTRIN) 600 MG tablet Take 1 tablet (600 mg) by mouth every 6 hours as needed for moderate pain (Patient not taking: Reported on 11/8/2018) 30 tablet 1     MAGIC MOUTHWASH, ENTER INGREDIENTS IN COMMENTS, Swish and spit 5-10 mLs in mouth every 6 hours as needed Pharmacy please compound 4 grams of carafate susp in 30 ml of Benadryl (12.5 mg/5 ml), 60 ml Maalox and 30 ml Viscous Lidocaine (Patient not taking: Reported on 2/21/2019) 160 mL 0     pimecrolimus (ELIDEL) 1 % external cream Apply topically 2 times daily as needed (itchiness) To itchy areas of head and face (Patient not taking: Reported on 2/21/2019) 60 g 3       Allergies  Allergies   Allergen Reactions     No Known Drug Allergies        Family History  family history includes Goiter in her maternal grandmother; Skin Cancer in her mother; Thyroid Cancer in her mother and sister.    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  GENERAL : thin young woman  In no apparent distress  SKIN: Normal color, normal temperature, texture.  No hirsutism,  or purple striae.   Scalp hair is thin  EYES: PERRL, EOMI, No scleral icterus,  No proptosis, conjunctival redness, stare, retraction  MOUTH: Moist, pink; pharynx clear  NECK: visible nodule appears 2 cm. No visible masses. No palpable adenopathy, or masses. No carotid bruits. THYROID:  Palpable ndoule approx 1.5 cm right  RESP: Lungs clear to auscultation bilaterally  CARDIAC: Regular rate and rhythm, normal S1 S2, without murmurs, rubs or gallops    ABDOMEN: Normal bowel sounds; soft, nontender, no HSM or masses       NEURO: awake, alert, responds appropriately to questions.  Cranial nerves intact.  Moves all extremities; Gait normal.  No tremor of the outstretched hand.  DTRs   2/4 ,   EXTREMITIES: No clubbing, cyanosis or edema.    DATA REVIEW    ENDO THYROID LABS-[facility] Latest Ref Rng & Units 2/27/2019 11/8/2018   TSH 0.40 - 4.00 mU/L 0.85 0.09 (L)   T4 TOTAL 4.5 - 12.5 MCG/DL     T4 FREE 0.76 - 1.46 ng/dL 1.24 1.97 (H)   T3, FREE        ENDO THYROID LABS-[facility] Latest Ref Rng & Units 12/18/2002 10/13/2001   TSH 0.40 - 4.00 mU/L 0.83 0.24 (L)   T4 TOTAL 4.5 - 12.5 MCG/DL  12.7 (H)   T4 FREE 0.76 - 1.46 ng/dL 0.96    T3, FREE  1.7 (L)      ENDO THYROID LABS-[facility] Latest Ref Rng & Units 8/11/2001   TSH 0.40 - 4.00 mU/L 0.94   T4 TOTAL 4.5 - 12.5 MCG/DL 10.2   T4 FREE 0.76 - 1.46 ng/dL    T3, FREE         Pathology #: [id number]                Date Obtained: " 10/06/2011                                           Date Received: 10/06/2011    DIAGNOSIS:   Thyroid, right lobe, fine needle aspiration:   - Consistent with a benign follicular nodule.                                   BERTHA ALVARADO MD                             (electronic signature)                               10/07/2011  15:57          CLINICAL NOTES:   Right thyroid nodule    ORGAN/TISSUE SITE:   Right thyroid fine needle aspiration    GROSS DESCRIPTION:   Received are ten fixed smears designated right thyroid fine needle   aspiration. These ten smears are submitted for cytologic examination.    MICROSCOPIC DESCRIPTION:   The specimen is adequate. Ten of 10 slides have hypocellular material   composed of macrofollicular fragments with cells that have small to   medium uniform nuclei with condensed nuclear chromatin. Colloid and   numerous histiocytes are present in the background. No prominent   formation of microfollicle, nuclear overlapping, nuclear grooves or   intranuclear cytoplasmic inclusions are identified. The findings are   consistent with a benign follicular nodule.      CPT Codes:    88173 x 1                             **End of Report**                        Final CYTOLOGY REPORT  Pathology #: [id number]                Date Obtained: 08/18/2011                                           Date Received: 08/18/2011  DIAGNOSIS:   Thyroid gland, right lobe nodule, fine needle aspiration:   -  Non-diagnostic, tissue quantity insufficient for diagnosis (see      comment).   COMMENT:   Insufficient numbers of follicular epithelial cells are present for   examination. Abundant colloid material admixed with many macrophages   suggest cystic change.                                OBED LANDRY MD                             (electronic signature)                               08/19/2011  12:30  CLINICAL NOTES:   Right thyroid nodule  ORGAN/TISSUE SITE:   Right thyroid fine needle aspiration  GROSS  DESCRIPTION:   Received are twelve fixed smears and one air dried smear designated   right thyroid fine needle aspiration. The twelve Papanicolaou stained   slides and one Patel's stained smear are submitted for cytologic   study.  MICROSCOPIC DESCRIPTION:   Reviewed are 13 smear preparations. All of the smears feature a   moderate amount of colloid material admixed with blood. Most slides   feature abundant macrophages consistent with cystic change. Rare   multinucleated giant cell macrophages are also present. Very rare   follicular epithelial cell groups are present, mostly obscured by   either blood or air drying artifact. The quantity of follicular   epithelium is insufficient for diagnosis.  CPT Codes:    88173 x 1                             **End of Report**     MR Brain W/WO IV Cont8/25/2011  [facility]  Result Impression   IMPRESSION: 2 x 2 millimeter focus of relative decreased enhancement   interposed between the adenohypophysis and neurohypophysis in the   midline.  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  A very   small pituitary microadenoma may also be considered.  Otherwise   unremarkable cranial MR examination.   Result Narrative     MRI brain without with contrast.      HISTORY: Hypothyroidism.     TECHNIQUE: sellar protocol without and with 10 mL intravenous   Magnevist.     FINDINGS: No prior comparison studies.     Brain: The ventricles, sulci and cisterns are of normal size and   configuration.  No evidence for and cranial mass, mass effect,   abnormal enhancement, acute infarction or flow-void abnormality. The   mastoid air cell regions, paranasal sinuses and craniocervical   junction appear unremarkable.     Sella: And there is a 2 x 2 mm focus of relative decreased   enhancement interposed between the adenohypophysis and   neurohypophysis as demonstrated on coronal T1 enhanced image 10 only.    The adenohypophysis, neurohypophysis, osseous sella, pituitary   infundibulum,  cavernous sinuses and, optic tracts, hypothalamic   region, optic chiasm and prechiasmatic optic nerves otherwise appear   unremarkable.                                    CYTOLOGY REPORT  Pathology #  [id number]                                Date Obtained: 15FEB00                                                         Date Received: 16FEB00  DIAGNOSIS:      Right thyroid nodule, fine needle aspiration:      1. Compatible with benign thyroid nodule.      2. Benign appearing follicular epithelial cells present with both a macro         and microfollicular pattern, abundant colloid, background lymphocytes         and macrophages.                                          Charly Laird MD                                          (electronic signature)  LOCO/LOCO/alexandro  Date of Report: 02/16/00  Pathology #  [id number]                                Date Obtained: 15FEB00                                                         Date Received: 16FEB00  SITE:      Right thyroid aspiration  GROSS DESCRIPTION:      Received are 12 smears designated right thyroid aspiration submitted for      cytologic examination.  PAC/bjw  MICROSCOPIC DESCRIPTION:      The direct smear preparations show abundant blood and watery colloid with      a modest number of thyroid follicular cells present in both larger sheets,      macrofollicles with occasional microfollicles. There is focal Hurthle cell      change. There is both macrophages and lymphocytes in the background watery      colloid.      EXAMINATION: US THYROID, 3/4/2019 11:30 AM      COMPARISON: None available     HISTORY: Hypothyroidism, unspecified type; Thyroid nodule     Technique: Grayscale and color ultrasound imaging of the thyroid was  performed.     Findings:  The right thyroid gland measures 2.2 x 2.0 x 5.7 cm, left  gland measures 1.3 x 0.9 x 3.8 cm, and the isthmus measures 2 mm in  thickness.      In the right mid/lower gland, there is a 1.9 x 1.6 x 3.3 cm  solid  nodule with internal vascularity and heterogeneous echogenicity. The  nodule has a few peripheral calcifications.     In the left mid gland, there is a 3 x 2 x 3 mm hypoechoic solid nodule  with a hypoechoic rim and peripheral vascularity.                                                                      Impression: 3.3 cm solid nodule in the right mid/lower thyroid gland.  Per chart review, this nodule measured 3.5 cm on ultrasound 8/19/2011  (comparison images not available) and had a benign biopsy result in  2/16/2000.     I have personally reviewed the examination and initial interpretation  and I agree with the findings.     JACKIE MONACO MD    Again, thank you for allowing me to participate in the care of your patient.      Sincerely,    Kaela Ellsworth MD

## 2019-02-27 NOTE — PROGRESS NOTES
Endocrine Consult note    Attending Assessment/Plan :     1.  Central hypothyroidism by history.  I appears  this was more related to her past active eating disorder and is unlikely to be permanent. She is currently on LT4 175 mcg/day. She may need a dose reduction.   She does not appear thyrotoxic.  Labs following the appt show biochemical euthyroidism on the current dose.     2.  Right thyroid nodule 3.5 cm in the past.  It feels much smaller on exam.   Repeat US following the appt shows the nodule has increased in size 93% since 2002, indicating it is growing very slowly.  The 2002 thyroid scan raised question that this is a hot nodule.  I am unable to see the actual images from 2002 on PACS.  Her TFTS do not suggest the presence of a hot nodule at least not to the point of thyrotoxicosis .       Weight loss.  Eating disorder has been treated multiple times. She says it is currently not active but her weight is down significantly from the past.      Night shift work.  Poor sleep hygiene.     Family history of thyroid cancer in mother and sister- Because of this history we should be more attentive to # 2.     Kaela Ellsworth MD      Chief complaint:  Lise is a 39 year old female seen in consultation at the request of Dr Donna Reynoso for hyperthyroidism.  I have reviewed Care Everywhere including [facility] lab reports, imaging reports and provider notes as indicated.      HISTORY OF PRESENT ILLNESS   There is a family history of thyroid cancer in her mother and her sister, type unknown.  In approximately 1999 as a senior in  she was noted to have a visible thyroid nodule.  I can see on the record she had FNAB in 2000 and then agin in 2011 both times with benign cytology    Lise  has previously been under the care of another endocrinologist, Dr Eva Garcia,whom she was seen by 8/8/11- 1/26/17. When she lst presented to Dr Garcia she was already on LT4 88 mcg/day.  The diagnosis of ? central  "hypothyroidism was made by Dr Chin - she doesn't recall the circumstances. She says it was because of the thyroid nodule.  I don't have any records from Dr Chin.  She believes the thyroid Nodule shrunk on the lT4 - still tehre but less visible  She has been on the current LT4 dose x years.  This is the same dose as she was on at the time of the last visit with Dr Shayla Salas states that Menarche was age 18.  The eating disorder (Anorexia nervosa) was diagnosed when she was in college. It has been associated with amenorrhea, low BMD, and HR down to 33 /minute.  She has been in many different treatment programs over time, including [facility], [facility] intensive outpatient program, [facility], [facility], [facility], [facility]. She says that she gained weight to get her periods back, which worked.  She has been off the OCP x years. She says she is currently having roughly monthly menses.  She says that the current weight was achieved by exercise and if she \"didn't care\".  However, she says she is not over exercising, no longer running like she used to.      2/15/00 thyroid nodule cytology   8/11/01: TSH 0.94, total T4 10.2  10/13/01 TSH 0.24, total T4 12.7  12/18/02: TSH 0.83, free T4 0.96, free T3 1.7  5/2/03: TSH 2.85,  T3 129, free t4 0.8  10/31/04: TSH 1.43, free T4 0.9, Free T3 2.0  6/29/11: TSH 2.13, TSH 1.82  7/8/11: TSH 3.2, free T4 0.9, free T3 1.6 - this was basis for Shayla diagnosis secondary hypothyroidsim while she was inpatient at [facility] for anorexia nervosa  8/19/11: TSH 2.69, prolactin 3.5, LH < 1, FSH 2.2, estradiol 11, cortisol 18.2  10/6/11 thyroid nodule cytology:   10/12/11: TSH 0.2, free T4 1.0, free T3 2.4, estradiol 15  10/13/11 : leptin 1.7 ng/dml (3.9-30 ng/ml)  11/18/11: leptin 4.4   12/28/11 TSH 0.2  1/19/12: TSH 2.14, free T4 0.8, free T3 2.0 on LT4 50 mcg/day ; outpatient treatment at [facility]; LT4 dose increased to " 75 mcg/day  2/25/12: TSH 0.23, free T4 1.0, free T3 1.0  7/16/12:   1/25/13: TSH < 0.03, free T4 1.5, free T3 2.5  8/20/13: TSH 3.35, free t4 0.6, free t3 1.6  4/25/14: TSH < 0.03, free T4 1.0, free T3 2.4, 25 OHD 8, Ca 9.1  1/9/15: TSH 0.59, free T4 0.7, free T3 1.5  3/9/15: TSH 0.04, free T4 1.0, free T3 2.0  1/26/17: TSH < 0.02, cortisol 10, free T4 1.4, free T3 2.6, FSH 4.2, prolactin 8.2, LH 4  11/8/18: TSH 0.09, free T4 1.97 -     Weights   1/26/17 weight 138    Imaging  I can see on PACS that she had thyroid US 12/19/02 and thyroid uptake/scan 12/29/02-in the [facility] system but I can't see images   12/19/02 thyroid US: Solid nodule right inferior 2.5 x 1.3 x 1.6 cm -sonographically indeterminate  12/20/02 123I thyroid uptake/scan: 24 hour uptake 11%; suggestion of slightly increased focal uptake right lobe which  could represent a hot nodule.   8/19/11 thyroid US ([facility]) - right thyroid nodule 3.5 cm, heterogeneous - FNAB 8/18/11 insufficient; FNAB 8/18/11 benign   8/25/11 MRI pituitary  : 2 mm focus decreased enhancement interposed between adenohypophysis and neurohypophysis in the midline .  10/6/11 FNAB thyroid nodule right  5/22/12 thyroid US: right nodule 3.1 x 1.6 x 1.6 cm   3/4/19 thyroid US (followed appt, not discussed at appt)- as read by me: right thyroid nodule 1.9 x 1.6 x 3.3, heterogeneous with somewhat irregular border . Shadowing calcification. Grade 3 blood flow.    Left nodule 0.3 cm    Topical steroid use for treatment of atopic dermatitis    REVIEW OF SYSTEMS  Energy is not OK - pretty tired  Naps doesn't really sleep; or will sleep x 13 hours  She can feel it a little with swallow  voice is OK  Cardiac: negative  Respiratory: negative  GI: diarrhea more than should  LMP 2/4/18; periods are monthly; she has been off OCP x years;   Menarche 18   Scalp dry and flaky  Skin is dry  Ears  Patch of that itches left upper chest  Labia   10 system ROS otherwise as per the HPI or  negative    Past Medical History  Past Medical History:   Diagnosis Date     Central hypothyroidism      Eating disorder      Eczematous dermatitis      Hypothalamic amenorrhea      OCD (obsessive compulsive disorder)      Right thyroid nodule      Seborrheic dermatitis      No past surgical history on file.      Medications    Current Outpatient Medications   Medication Sig Dispense Refill     cholecalciferol (VITAMIN D3) 1000 UNIT tablet Take 5,000 Units by mouth        fluocinonide (LIDEX) 0.05 % solution Apply topically 2 times daily 60 mL 3     hydrocortisone 2.5 % cream Apply topically 2 times daily 30 g 3     ketoconazole (NIZORAL) 2 % shampoo Apply topically daily as needed for itching or irritation 120 mL 11     levothyroxine (SYNTHROID, LEVOTHROID) 175 MCG tablet Take 175 mcg by mouth daily       triamcinolone (KENALOG) 0.1 % ointment Apply topically 2 times daily 454 g 3     augmented betamethasone dipropionate (DIPROLENE-AF) 0.05 % external ointment Apply topically 2 times daily (Patient not taking: Reported on 2/27/2019) 45 g 3     ibuprofen (ADVIL,MOTRIN) 600 MG tablet Take 1 tablet (600 mg) by mouth every 6 hours as needed for moderate pain (Patient not taking: Reported on 11/8/2018) 30 tablet 1     ibuprofen (ADVIL,MOTRIN) 600 MG tablet Take 1 tablet (600 mg) by mouth every 6 hours as needed for moderate pain (Patient not taking: Reported on 11/8/2018) 30 tablet 1     MAGIC MOUTHWASH, ENTER INGREDIENTS IN COMMENTS, Swish and spit 5-10 mLs in mouth every 6 hours as needed Pharmacy please compound 4 grams of carafate susp in 30 ml of Benadryl (12.5 mg/5 ml), 60 ml Maalox and 30 ml Viscous Lidocaine (Patient not taking: Reported on 2/21/2019) 160 mL 0     pimecrolimus (ELIDEL) 1 % external cream Apply topically 2 times daily as needed (itchiness) To itchy areas of head and face (Patient not taking: Reported on 2/21/2019) 60 g 3       Allergies  Allergies   Allergen Reactions     No Known Drug Allergies   "      Family History  family history includes Goiter in her maternal grandmother; Skin Cancer in her mother; Thyroid Cancer in her mother and sister.    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  GENERAL : thin young woman  In no apparent distress  SKIN: Normal color, normal temperature, texture.  No hirsutism,  or purple striae.   Scalp hair is thin  EYES: PERRL, EOMI, No scleral icterus,  No proptosis, conjunctival redness, stare, retraction  MOUTH: Moist, pink; pharynx clear  NECK: visible nodule appears 2 cm. No visible masses. No palpable adenopathy, or masses. No carotid bruits. THYROID:  Palpable ndoule approx 1.5 cm right  RESP: Lungs clear to auscultation bilaterally  CARDIAC: Regular rate and rhythm, normal S1 S2, without murmurs, rubs or gallops    ABDOMEN: Normal bowel sounds; soft, nontender, no HSM or masses       NEURO: awake, alert, responds appropriately to questions.  Cranial nerves intact.  Moves all extremities; Gait normal.  No tremor of the outstretched hand.  DTRs   2/4 ,   EXTREMITIES: No clubbing, cyanosis or edema.    DATA REVIEW    ENDO THYROID LABS-[facility] Latest Ref Rng & Units 2/27/2019 11/8/2018   TSH 0.40 - 4.00 mU/L 0.85 0.09 (L)   T4 TOTAL 4.5 - 12.5 MCG/DL     T4 FREE 0.76 - 1.46 ng/dL 1.24 1.97 (H)   T3, FREE        ENDO THYROID LABS-[facility] Latest Ref Rng & Units 12/18/2002 10/13/2001   TSH 0.40 - 4.00 mU/L 0.83 0.24 (L)   T4 TOTAL 4.5 - 12.5 MCG/DL  12.7 (H)   T4 FREE 0.76 - 1.46 ng/dL 0.96    T3, FREE  1.7 (L)      ENDO THYROID LABS-[facility] Latest Ref Rng & Units 8/11/2001   TSH " 0.40 - 4.00 mU/L 0.94   T4 TOTAL 4.5 - 12.5 MCG/DL 10.2   T4 FREE 0.76 - 1.46 ng/dL    T3, FREE         Pathology #: [id number]                Date Obtained: 10/06/2011                                           Date Received: 10/06/2011    DIAGNOSIS:   Thyroid, right lobe, fine needle aspiration:   - Consistent with a benign follicular nodule.                                   BERTHA ALVARADO MD                             (electronic signature)                               10/07/2011  15:57          CLINICAL NOTES:   Right thyroid nodule    ORGAN/TISSUE SITE:   Right thyroid fine needle aspiration    GROSS DESCRIPTION:   Received are ten fixed smears designated right thyroid fine needle   aspiration. These ten smears are submitted for cytologic examination.    MICROSCOPIC DESCRIPTION:   The specimen is adequate. Ten of 10 slides have hypocellular material   composed of macrofollicular fragments with cells that have small to   medium uniform nuclei with condensed nuclear chromatin. Colloid and   numerous histiocytes are present in the background. No prominent   formation of microfollicle, nuclear overlapping, nuclear grooves or   intranuclear cytoplasmic inclusions are identified. The findings are   consistent with a benign follicular nodule.      CPT Codes:    88173 x 1                             **End of Report**                        Final CYTOLOGY REPORT  Pathology #: [id number]                Date Obtained: 08/18/2011                                           Date Received: 08/18/2011  DIAGNOSIS:   Thyroid gland, right lobe nodule, fine needle aspiration:   -  Non-diagnostic, tissue quantity insufficient for diagnosis (see      comment).   COMMENT:   Insufficient numbers of follicular epithelial cells are present for   examination. Abundant colloid material admixed with many macrophages   suggest cystic change.                                OBED LANDRY MD                             (electronic  signature)                               08/19/2011  12:30  CLINICAL NOTES:   Right thyroid nodule  ORGAN/TISSUE SITE:   Right thyroid fine needle aspiration  GROSS DESCRIPTION:   Received are twelve fixed smears and one air dried smear designated   right thyroid fine needle aspiration. The twelve Papanicolaou stained   slides and one Patel's stained smear are submitted for cytologic   study.  MICROSCOPIC DESCRIPTION:   Reviewed are 13 smear preparations. All of the smears feature a   moderate amount of colloid material admixed with blood. Most slides   feature abundant macrophages consistent with cystic change. Rare   multinucleated giant cell macrophages are also present. Very rare   follicular epithelial cell groups are present, mostly obscured by   either blood or air drying artifact. The quantity of follicular   epithelium is insufficient for diagnosis.  CPT Codes:    88173 x 1                             **End of Report**     MR Brain W/WO IV Cont8/25/2011  [facility]  Result Impression   IMPRESSION: 2 x 2 millimeter focus of relative decreased enhancement   interposed between the adenohypophysis and neurohypophysis in the   midline.  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  A very   small pituitary microadenoma may also be considered.  Otherwise   unremarkable cranial MR examination.   Result Narrative     MRI brain without with contrast.      HISTORY: Hypothyroidism.     TECHNIQUE: sellar protocol without and with 10 mL intravenous   Magnevist.     FINDINGS: No prior comparison studies.     Brain: The ventricles, sulci and cisterns are of normal size and   configuration.  No evidence for and cranial mass, mass effect,   abnormal enhancement, acute infarction or flow-void abnormality. The   mastoid air cell regions, paranasal sinuses and craniocervical   junction appear unremarkable.     Sella: And there is a 2 x 2 mm focus of relative decreased   enhancement interposed between the adenohypophysis  and   neurohypophysis as demonstrated on coronal T1 enhanced image 10 only.    The adenohypophysis, neurohypophysis, osseous sella, pituitary   infundibulum, cavernous sinuses and, optic tracts, hypothalamic   region, optic chiasm and prechiasmatic optic nerves otherwise appear   unremarkable.                                    CYTOLOGY REPORT  Pathology #  [id number]                                Date Obtained: 15FEB00                                                         Date Received: 16FEB00  DIAGNOSIS:      Right thyroid nodule, fine needle aspiration:      1. Compatible with benign thyroid nodule.      2. Benign appearing follicular epithelial cells present with both a macro         and microfollicular pattern, abundant colloid, background lymphocytes         and macrophages.                                          Charly Laird MD                                          (electronic signature)  LOCO/LOCO/alexandro  Date of Report: 02/16/00  Pathology #  [id number]                                Date Obtained: 15FEB00                                                         Date Received: 16FEB00  SITE:      Right thyroid aspiration  GROSS DESCRIPTION:      Received are 12 smears designated right thyroid aspiration submitted for      cytologic examination.  PAC/bjw  MICROSCOPIC DESCRIPTION:      The direct smear preparations show abundant blood and watery colloid with      a modest number of thyroid follicular cells present in both larger sheets,      macrofollicles with occasional microfollicles. There is focal Hurthle cell      change. There is both macrophages and lymphocytes in the background watery      colloid.      EXAMINATION: US THYROID, 3/4/2019 11:30 AM      COMPARISON: None available     HISTORY: Hypothyroidism, unspecified type; Thyroid nodule     Technique: Grayscale and color ultrasound imaging of the thyroid was  performed.     Findings:  The right thyroid gland measures 2.2 x 2.0 x 5.7 cm,  left  gland measures 1.3 x 0.9 x 3.8 cm, and the isthmus measures 2 mm in  thickness.      In the right mid/lower gland, there is a 1.9 x 1.6 x 3.3 cm solid  nodule with internal vascularity and heterogeneous echogenicity. The  nodule has a few peripheral calcifications.     In the left mid gland, there is a 3 x 2 x 3 mm hypoechoic solid nodule  with a hypoechoic rim and peripheral vascularity.                                                                      Impression: 3.3 cm solid nodule in the right mid/lower thyroid gland.  Per chart review, this nodule measured 3.5 cm on ultrasound 8/19/2011  (comparison images not available) and had a benign biopsy result in  2/16/2000.     I have personally reviewed the examination and initial interpretation  and I agree with the findings.     JACKIE MONACO MD

## 2019-02-27 NOTE — NURSING NOTE
Chief Complaint   Patient presents with     Consult     hypothyroid and nodules      Amina Aponte CMA

## 2019-02-28 ENCOUNTER — OFFICE VISIT (OUTPATIENT)
Dept: OBGYN | Facility: CLINIC | Age: 39
End: 2019-02-28
Payer: COMMERCIAL

## 2019-02-28 VITALS
HEIGHT: 65 IN | BODY MASS INDEX: 19.29 KG/M2 | SYSTOLIC BLOOD PRESSURE: 90 MMHG | WEIGHT: 115.8 LBS | DIASTOLIC BLOOD PRESSURE: 59 MMHG | HEART RATE: 61 BPM

## 2019-02-28 DIAGNOSIS — Z13.9 SCREENING FOR CONDITION: Primary | ICD-10-CM

## 2019-02-28 DIAGNOSIS — Z97.5 IUD (INTRAUTERINE DEVICE) IN PLACE: ICD-10-CM

## 2019-02-28 LAB
HCG UR QL: NEGATIVE
INTERNAL QC OK POCT: YES

## 2019-02-28 PROCEDURE — G0463 HOSPITAL OUTPT CLINIC VISIT: HCPCS | Mod: ZF

## 2019-02-28 PROCEDURE — 58300 INSERT INTRAUTERINE DEVICE: CPT | Mod: ZF | Performed by: STUDENT IN AN ORGANIZED HEALTH CARE EDUCATION/TRAINING PROGRAM

## 2019-02-28 PROCEDURE — 87491 CHLMYD TRACH DNA AMP PROBE: CPT | Performed by: STUDENT IN AN ORGANIZED HEALTH CARE EDUCATION/TRAINING PROGRAM

## 2019-02-28 PROCEDURE — 87591 N.GONORRHOEAE DNA AMP PROB: CPT | Performed by: STUDENT IN AN ORGANIZED HEALTH CARE EDUCATION/TRAINING PROGRAM

## 2019-02-28 PROCEDURE — 88175 CYTOPATH C/V AUTO FLUID REDO: CPT | Performed by: STUDENT IN AN ORGANIZED HEALTH CARE EDUCATION/TRAINING PROGRAM

## 2019-02-28 PROCEDURE — 81025 URINE PREGNANCY TEST: CPT | Mod: ZF | Performed by: STUDENT IN AN ORGANIZED HEALTH CARE EDUCATION/TRAINING PROGRAM

## 2019-02-28 PROCEDURE — 87624 HPV HI-RISK TYP POOLED RSLT: CPT | Performed by: STUDENT IN AN ORGANIZED HEALTH CARE EDUCATION/TRAINING PROGRAM

## 2019-02-28 PROCEDURE — 25000125 ZZHC RX 250: Mod: ZF

## 2019-02-28 RX ORDER — COPPER 313.4 MG/1
1 INTRAUTERINE DEVICE INTRAUTERINE ONCE
Qty: 1 EACH | Refills: 0
Start: 2019-02-28 | End: 2019-04-27

## 2019-02-28 ASSESSMENT — MIFFLIN-ST. JEOR: SCORE: 1206.15

## 2019-02-28 NOTE — Clinical Note
Hello - I am not sure how to bill this because it was a procedure, but I also did a whole annual exam - can't I add a procedure modifier? :)

## 2019-02-28 NOTE — PROGRESS NOTES
"[facility]  Gynecology Visit    Reason for Consult: Set up primary gyn care  Consulting Provider: self    HPI:    Lise Ronquillo is a 39 year old G0 here to establish gynecological care and for annual exam. Denies any ob/gyn problems today. Is thinking about becoming sexually active so would like contraception. Though would like to avoid hormones at this time.     GYN History  - LMP: Patient's last menstrual period was 2019 (exact date).  - Menses: regular for the past 4 months, previously was amenorrheic in the setting of Anorexia. Was amenorrheic for about 10 years  - Pap Smears: Does not remember when she had her last pap, thinks is was . Thinks also has a history of abnormal, for which she had a colposcopy, but then after was told she could get them normally  - Contraception: currently nothing, not sexually active  - Sexual Activity/Concerns: none at this time  - Hx STIs/UTIs: denies    OBHx  G0    PMHx:   Central Hypothyroidism, followed by endocrinology  Hx of eating disorder - Anorexia - s/p multiple treatments     PSHx: denies  Meds: VitD, Calcium, Synthroid 150mcg  Allergies:  No known allergies    SocHx: Works as a nurse. Denies smoking, drinking    FamHx:    Hx of thyroid cancer in family. Thinks mother's sister  of breast cancer at age 36. Never had genetic testing. Mother healthy, 70yo, no breast cancer.     ROS: Denies headache, chest pain, dyspnea, trouble with nausea/vomiting, abdominal pain. Has occasional constipation. Has regular menses, denies vaginal discharge. No urinary symptoms. Endorses dry skin and thin hair. Thinks that her weight has been stable and feels good about it currently. Has been stable for past few years    Physical Exam  BP 90/59 (BP Location: Left arm, Patient Position: Chair)   Pulse 61   Ht 1.651 m (5' 5\")   Wt 52.5 kg (115 lb 12.8 oz)   LMP 2019 (Exact Date)   Breastfeeding? No   BMI 19.27 kg/m    Gen: Well-appearing, NAD  HEENT: " "Normocephalic, atraumatic  Neck: Thyroid is not visually enlarged  CV:  Regular rate and rhythm  Pulm: nonlabored breathing  Abd: Soft, non-tender, non-distended  Ext: No LE edema, extremities warm and well perfused    Breast: Symmetric, no nipple discharge or retraction, no axillary lymphadenopathy, no palpable nodules or masses bilaterally. Negative axillary lymph nodes    Pelvic:  Normal appearing external female genitalia. Normal hair distribution. Lashay speculum placed without difficulty, moderate amount of physiologic discharge present. Gc/Ct/Trich swab collected. Pap collected. Cervix easily visualized, healthy appearing without lesions, nulliparous in appearance. Uterus is small, mobile and nontender, anteverted. Paragard placed (see procedure note below)    Assessment/Plan:  Lise Ronquillo is a 39 year old G0. female here to establish gynecology care. Healthy, without concerns today  - Pap with cotesting, Gc/Chlam and trichomonas collected today. Will phone with abnormal results  - HIV, HepB, RPR ordered, plan to stop at lab on her way out  - Ordered DEXA scan in the setting of low BMD and Anorexia in the past. Most recent in   - Recommend Mammogram starting at age 40. Pap w/ cotesting in 5 years if normal   - Paragard placed for contraception, Exp 10/2024, NDC [address]-1. Discussed returning for a string check in 4-6weeks    Return to clinic for string check or in 1 year for annual exam    Patient seen and staffed with Dr Johnson. She was present for entire exam and IUD insertion     Shani Waterman MD  Obstetrics and Gyncology, PGY-2  2019 , 3:40 PM      IUD Procedure Note    Time Out - \"Pause for the Cause\"  Just before the procedure begins, through verbal and active participation of team members, verify:                         Initials    Patient Name  ams   Patient   ams    Procedure to be performed  ams  "                                                                                                                                       Indication: contraception    Consent: Risks, benefits of treatment, and no treatment were discussed.  Patient's questions were elicited and answered and written consent signed and scanned into medical record. Patient was counseled on efficacy, benefits, risks, potential side effects of IUD.  Insertion procedure and risk of infection, perforation, and spontaneous expulsion reviewed.   Advised to plan removal and/or replacement of IUD in 5 years from today or when desired.    Procedure: Using sterile technique, a medium flor speculum was placed and the cervix was visualized. The cervix was prepped with Betadine.  A single tooth tenaculum was applied to the anterior lip of the cervix. A paracervical block was performed and 5mL of lidocaine was placed at each 4 and 8o'clock in the cervicovaginal junction. The uterine sound was unable to be passed and therefore the cervix was dilated to 3mm. The cervical os was noted to be very anterior. The uterus was then sounded to 7 cm x2. The Paragard IUD insertion apparatus was prepared and placed through the cervix without significant resistance and deployed at the fundus in the usual fashion. The strings were trimmed 3 cm from the external os.  The tenaculum was removed from the cervix and the tenaculum site made hemostatic with pressure.    Exp: 10/24  NDC: [address]-9    EBL: 5 cc    Complications:  None apparent    Tolerance of Procedure:  Patient did tolerate the procedure well.    Shani Waterman MD  Obstetrics and Gyncology, PGY-2  February 28, 2019 , 5:58 PM        I have seen and examined the patient with the resident. I have reviewed, edited, and agree with the note.   I personally performed cervical dilation and supervised the IUD insertion.   Merry Johnson MD

## 2019-02-28 NOTE — PATIENT INSTRUCTIONS
Please follow up as desired for a string check or in 1 year for annual exam. Will send a letter with Pap results unless abnormal, then will call with answer    Please call to schedule DEXA scan

## 2019-03-01 LAB
C TRACH DNA SPEC QL NAA+PROBE: NEGATIVE
CALCIT SERPL-MCNC: <2 PG/ML (ref 0–5.1)
N GONORRHOEA DNA SPEC QL NAA+PROBE: NEGATIVE
SPECIMEN SOURCE: NORMAL
SPECIMEN SOURCE: NORMAL

## 2019-03-04 ENCOUNTER — ANCILLARY PROCEDURE (OUTPATIENT)
Dept: ULTRASOUND IMAGING | Facility: CLINIC | Age: 39
End: 2019-03-04
Payer: COMMERCIAL

## 2019-03-04 DIAGNOSIS — E04.1 THYROID NODULE: ICD-10-CM

## 2019-03-04 DIAGNOSIS — E03.9 HYPOTHYROIDISM, UNSPECIFIED TYPE: ICD-10-CM

## 2019-03-05 LAB
COPATH REPORT: NORMAL
PAP: NORMAL

## 2019-03-07 LAB
FINAL DIAGNOSIS: ABNORMAL
HPV HR 12 DNA CVX QL NAA+PROBE: POSITIVE
HPV16 DNA SPEC QL NAA+PROBE: NEGATIVE
HPV18 DNA SPEC QL NAA+PROBE: NEGATIVE
SPECIMEN DESCRIPTION: ABNORMAL
SPECIMEN SOURCE CVX/VAG CYTO: ABNORMAL

## 2019-03-17 PROBLEM — Z80.8 FAMILY HISTORY OF THYROID CANCER: Status: ACTIVE | Noted: 2019-03-17

## 2019-03-17 PROBLEM — E04.1 THYROID NODULE: Status: ACTIVE | Noted: 2019-03-17

## 2019-03-17 PROBLEM — Z72.821 POOR SLEEP HYGIENE: Status: ACTIVE | Noted: 2019-03-17

## 2019-03-17 PROBLEM — E03.9 HYPOTHYROIDISM, UNSPECIFIED TYPE: Status: ACTIVE | Noted: 2019-03-17

## 2019-04-27 ENCOUNTER — OFFICE VISIT (OUTPATIENT)
Dept: URGENT CARE | Facility: URGENT CARE | Age: 39
End: 2019-04-27
Payer: COMMERCIAL

## 2019-04-27 VITALS
WEIGHT: 109 LBS | HEART RATE: 57 BPM | OXYGEN SATURATION: 100 % | BODY MASS INDEX: 18.14 KG/M2 | SYSTOLIC BLOOD PRESSURE: 87 MMHG | TEMPERATURE: 97.7 F | DIASTOLIC BLOOD PRESSURE: 55 MMHG

## 2019-04-27 DIAGNOSIS — S61.011A THUMB LACERATION, RIGHT, INITIAL ENCOUNTER: Primary | ICD-10-CM

## 2019-04-27 PROCEDURE — 99213 OFFICE O/P EST LOW 20 MIN: CPT | Performed by: FAMILY MEDICINE

## 2019-04-27 NOTE — PROGRESS NOTES
Subjective:   Lise Ronquillo is a 39 year old female who presents for   Chief Complaint   Patient presents with     Urgent Care     Pt in clinic to have eval for right thumb laceration.     Laceration     Patient was at gas station on her way to work when this occurred, threw something away in the trash but didn't feel pain but may have had hand enter the trash can - it was when she was using her credit card to pay at the pump that she noticed blood in this finger.   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 Exp: 10/2024, NDC [address]-3       Eczema, unspecified type 11/29/2018     Priority: Medium     Dermatitis, seborrheic 11/29/2018     Priority: Medium     Pruritus 11/29/2018     Priority: Medium     Neoplasm of uncertain behavior of skin 11/29/2018     Priority: Medium     Anorexia 12/24/2001     Priority: Medium       Current Outpatient Medications   Medication     levothyroxine (SYNTHROID/LEVOTHROID) 175 MCG tablet     paragard intrauterine copper device     augmented betamethasone dipropionate (DIPROLENE-AF) 0.05 % external ointment     cholecalciferol (VITAMIN D3) 1000 UNIT tablet     fluocinonide (LIDEX) 0.05 % solution     hydrocortisone 2.5 % cream     ibuprofen (ADVIL,MOTRIN) 600 MG tablet     ibuprofen (ADVIL,MOTRIN) 600 MG tablet     ketoconazole (NIZORAL) 2 % shampoo     MAGIC MOUTHWASH, ENTER INGREDIENTS IN COMMENTS,     pimecrolimus (ELIDEL) 1 % external cream     triamcinolone (KENALOG) 0.1 % ointment     No current facility-administered medications for this visit.         ROS:  As above per HPI    Objective:   BP (!) 87/55   Pulse 57   Temp 97.7  F (36.5  C) (Oral)   Wt 49.4 kg (109 lb)   SpO2 100%   BMI 18.14 kg/m  , Body mass index is 18.14 kg/m .  Gen:  NAD, well-nourished, sitting in chair comfortably  HEENT: EOMI, sclera anicteric, Head normocephalic, ; nares patent; moist mucous membranes  Neck: trachea midline, no thyromegaly  CV:  Hemodynamically stable, cap refill < 2 seconds  Pulm:  no increased work of breathing   Skin: superficial < 1 cm laceration with no impressive depth and no gaping of the wound. Good hemostasis. Intact sensation to touch.         Assessment & Plan:   Lise Ronquillo, 39 year old female who presents with:    Thumb laceration, right, initial encounter  Wound soaked in hibiclens/saline solution for several minutes . Dried off with clean gauze. [place] Skin glue was used to seal the wound . Bandage was applied - and a roll of of coban was given for her use later if she would like.     Discussed observing for signs of infection: redness/pain/swelling/drainage      Kash Ye MD   [facility]    The use of Dragon/[employer] dictation services may have been used to construct the content in this note; any grammatical or spelling errors are non-intentional. Please contact the author of this note directly if you are in need of any clarification.

## 2019-04-27 NOTE — PROGRESS NOTES
Subjective:   Lise Ronquillo is a 39 year old female who presents for   Chief Complaint   Patient presents with     Urgent Care     Pt in clinic to have eval for right thumb laceration.     Laceration     Patient was at gas station on her way to work when this occurred, threw something away in the trash but didn't feel pain but may have had hand enter the trash can - it was when she was using her credit card to pay at the pump that she noticed blood in this finger.   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 Exp: 10/2024, NDC [address]-5       Eczema, unspecified type 11/29/2018     Priority: Medium     Dermatitis, seborrheic 11/29/2018     Priority: Medium     Pruritus 11/29/2018     Priority: Medium     Neoplasm of uncertain behavior of skin 11/29/2018     Priority: Medium     Anorexia 12/24/2001     Priority: Medium       Current Outpatient Medications   Medication     levothyroxine (SYNTHROID/LEVOTHROID) 175 MCG tablet     paragard intrauterine copper device     augmented betamethasone dipropionate (DIPROLENE-AF) 0.05 % external ointment     cholecalciferol (VITAMIN D3) 1000 UNIT tablet     fluocinonide (LIDEX) 0.05 % solution     hydrocortisone 2.5 % cream     ibuprofen (ADVIL,MOTRIN) 600 MG tablet     ibuprofen (ADVIL,MOTRIN) 600 MG tablet     ketoconazole (NIZORAL) 2 % shampoo     MAGIC MOUTHWASH, ENTER INGREDIENTS IN COMMENTS,     pimecrolimus (ELIDEL) 1 % external cream     triamcinolone (KENALOG) 0.1 % ointment     No current facility-administered medications for this visit.         ROS:  As above per HPI    Objective:   BP (!) 87/55   Pulse 57   Temp 97.7  F (36.5  C) (Oral)   Wt 49.4 kg (109 lb)   SpO2 100%   BMI 18.14 kg/m  , Body mass index is 18.14 kg/m .  Gen:  NAD, well-nourished, sitting in chair comfortably  HEENT: EOMI, sclera anicteric, Head normocephalic, ; nares patent; moist mucous membranes  Neck: trachea midline, no thyromegaly  CV:  Hemodynamically stable, cap refill < 2 seconds  Pulm:  no increased work of breathing   Skin: superficial < 1 cm laceration with no impressive depth and no gaping of the wound. Good hemostasis. Intact sensation to touch.         Assessment & Plan:   Lise Ronquillo, 39 year old female who presents with:    Thumb laceration, right, initial encounter  Wound soaked in hibiclens/saline solution for several minutes . Dried off with clean gauze. [place] Skin glue was used to seal the wound . Bandage was applied - and a roll of of coban was given for her use later if she would like.     Discussed observing for signs of infection: redness/pain/swelling/drainage      Kash Ye MD   [facility]    The use of Dragon/[employer] dictation services may have been used to construct the content in this note; any grammatical or spelling errors are non-intentional. Please contact the author of this note directly if you are in need of any clarification.

## 2019-11-03 ENCOUNTER — HEALTH MAINTENANCE LETTER (OUTPATIENT)
Age: 39
End: 2019-11-03

## 2019-11-05 ENCOUNTER — OFFICE VISIT (OUTPATIENT)
Dept: URGENT CARE | Facility: URGENT CARE | Age: 39
End: 2019-11-05
Payer: COMMERCIAL

## 2019-11-05 VITALS
WEIGHT: 103 LBS | RESPIRATION RATE: 14 BRPM | HEART RATE: 64 BPM | DIASTOLIC BLOOD PRESSURE: 56 MMHG | SYSTOLIC BLOOD PRESSURE: 92 MMHG | BODY MASS INDEX: 17.14 KG/M2 | OXYGEN SATURATION: 98 % | TEMPERATURE: 97.9 F

## 2019-11-05 DIAGNOSIS — J02.9 PHARYNGITIS, UNSPECIFIED ETIOLOGY: ICD-10-CM

## 2019-11-05 DIAGNOSIS — R07.0 THROAT PAIN: Primary | ICD-10-CM

## 2019-11-05 LAB
DEPRECATED S PYO AG THROAT QL EIA: NORMAL
SPECIMEN SOURCE: NORMAL

## 2019-11-05 PROCEDURE — 87081 CULTURE SCREEN ONLY: CPT | Performed by: PREVENTIVE MEDICINE

## 2019-11-05 PROCEDURE — 99213 OFFICE O/P EST LOW 20 MIN: CPT | Performed by: PREVENTIVE MEDICINE

## 2019-11-05 PROCEDURE — 87880 STREP A ASSAY W/OPTIC: CPT | Performed by: FAMILY MEDICINE

## 2019-11-05 NOTE — PROGRESS NOTES
SUBJECTIVE:  Lise Ronquillo is a 39 year old female with a chief complaint of sore throat.  Onset of symptoms was 2 day(s) ago.    Course of illness: gradual onset.  Severity mild  Current and Associated symptoms: sore throat  Treatment measures tried include Tylenol/Ibuprofen.  Predisposing factors include ill contact: Family member .    Past Medical History:   Diagnosis Date     ADHD 2006     Central hypothyroidism 2011    diagnosis during active eating disorder; unlikely to be permanent     Eating disorder 2001     Eczematous dermatitis      Hypothalamic amenorrhea      IUD (intrauterine device) in place 02/28/2019    Paragard in place. Exp: 10/2024, NDC [address]-3     OCD (obsessive compulsive disorder)     she doesnt' believe this     Right thyroid nodule 1999     Seborrheic dermatitis      Current Outpatient Medications   Medication Sig Dispense Refill     augmented betamethasone dipropionate (DIPROLENE-AF) 0.05 % external ointment Apply topically 2 times daily 45 g 3     cholecalciferol (VITAMIN D3) 1000 UNIT tablet Take 5,000 Units by mouth        fluocinonide (LIDEX) 0.05 % solution Apply topically 2 times daily 60 mL 3     hydrocortisone 2.5 % cream Apply topically 2 times daily 30 g 3     ibuprofen (ADVIL,MOTRIN) 600 MG tablet Take 1 tablet (600 mg) by mouth every 6 hours as needed for moderate pain 30 tablet 1     ibuprofen (ADVIL,MOTRIN) 600 MG tablet Take 1 tablet (600 mg) by mouth every 6 hours as needed for moderate pain 30 tablet 1     ketoconazole (NIZORAL) 2 % shampoo Apply topically daily as needed for itching or irritation 120 mL 11     levothyroxine (SYNTHROID/LEVOTHROID) 175 MCG tablet Take 1 tablet (175 mcg) by mouth daily 90 tablet 3     MAGIC MOUTHWASH, ENTER INGREDIENTS IN COMMENTS, Swish and spit 5-10 mLs in mouth every 6 hours as needed Pharmacy please compound 4 grams of carafate susp in 30 ml of Benadryl (12.5 mg/5 ml), 60 ml Maalox and 30 ml Viscous Lidocaine 160 mL 0      pimecrolimus (ELIDEL) 1 % external cream Apply topically 2 times daily as needed (itchiness) To itchy areas of head and face 60 g 3     triamcinolone (KENALOG) 0.1 % ointment Apply topically 2 times daily 454 g 3     Social History     Tobacco Use     Smoking status: Never Smoker     Smokeless tobacco: Never Used   Substance Use Topics     Alcohol use: No       ROS:  CONSTITUTIONAL:NEGATIVE for fever, chills, change in weight  INTEGUMENTARY/SKIN: NEGATIVE for worrisome rashes, moles or lesions  EYES: NEGATIVE for vision changes or irritation  RESP:NEGATIVE for significant cough or SOB  CV: NEGATIVE for chest pain, palpitations or peripheral edema  GI: NEGATIVE for nausea, abdominal pain, heartburn, or change in bowel habits  MUSCULOSKELETAL: NEGATIVE for significant arthralgias or myalgia  NEURO: NEGATIVE for weakness, dizziness or paresthesias  ENDOCRINE: NEGATIVE for temperature intolerance, skin/hair changes  HEME/ALLERGY/IMMUNE: NEGATIVE for bleeding problems    OBJECTIVE:   BP 92/56   Pulse 64   Temp 97.9  F (36.6  C)   Resp 14   Wt 46.7 kg (103 lb)   SpO2 98%   BMI 17.14 kg/m    GENERAL APPEARANCE: healthy, alert and no distress  EYES: EOMI,  PERRL, conjunctiva clear  HENT: ear canals and TM's normal.  Nose normal.  Pharynx erythematous with some exudate noted.  NECK: supple, non-tender to palpation, no adenopathy noted  RESP: lungs clear to auscultation - no rales, rhonchi or wheezes  CV: regular rates and rhythm, normal S1 S2, no murmur noted  ABDOMEN:  soft, nontender, no HSM or masses and bowel sounds normal  SKIN: no suspicious lesions or rashes    Rapid Strep test is negative; await throat culture results.    ASSESSMENT:   Throat pain  Viral pharyngitis    PLAN:   See orders in epic.   Symptomatic treat with gargles, lozenges, and OTC analgesic as needed. Follow-up with primary clinic if not improving.

## 2019-11-05 NOTE — PATIENT INSTRUCTIONS
Viral pharyngitis     PLAN:   See orders in epic.   Symptomatic treat with gargles, lozenges, and OTC analgesic as needed. Follow-up with primary clinic if not improving.

## 2019-11-06 LAB
BACTERIA SPEC CULT: NORMAL
SPECIMEN SOURCE: NORMAL

## 2020-05-05 ENCOUNTER — MYC REFILL (OUTPATIENT)
Dept: ENDOCRINOLOGY | Facility: CLINIC | Age: 40
End: 2020-05-05

## 2020-05-05 DIAGNOSIS — E03.9 HYPOTHYROIDISM, UNSPECIFIED TYPE: ICD-10-CM

## 2020-05-05 DIAGNOSIS — E04.1 THYROID NODULE: ICD-10-CM

## 2020-05-06 RX ORDER — LEVOTHYROXINE SODIUM 175 UG/1
175 TABLET ORAL DAILY
Qty: 90 TABLET | Refills: 3 | Status: SHIPPED | OUTPATIENT
Start: 2020-05-06 | End: 2021-11-02

## 2020-05-06 NOTE — TELEPHONE ENCOUNTER
levothyroxine (SYNTHROID/LEVOTHROID) 175 MCG tablet  Last Written Prescription Date: 2/27/19  Last Fill Quantity: 90,   # refills: 3  Last Office Visit : 2/27/19  Future Office visit:  None    Routing refill request to provider for review/approval because: provider preference.  tsh past due

## 2020-09-02 ENCOUNTER — OFFICE VISIT (OUTPATIENT)
Dept: OBGYN | Facility: CLINIC | Age: 40
End: 2020-09-02
Payer: COMMERCIAL

## 2020-09-02 VITALS — BODY MASS INDEX: 20.14 KG/M2 | DIASTOLIC BLOOD PRESSURE: 52 MMHG | WEIGHT: 121 LBS | SYSTOLIC BLOOD PRESSURE: 90 MMHG

## 2020-09-02 DIAGNOSIS — Z12.4 SCREENING FOR CERVICAL CANCER: ICD-10-CM

## 2020-09-02 DIAGNOSIS — Z30.432 ENCOUNTER FOR REMOVAL OF INTRAUTERINE CONTRACEPTIVE DEVICE: Primary | ICD-10-CM

## 2020-09-02 PROCEDURE — 58301 REMOVE INTRAUTERINE DEVICE: CPT | Performed by: ADVANCED PRACTICE MIDWIFE

## 2020-09-02 PROCEDURE — G0145 SCR C/V CYTO,THINLAYER,RESCR: HCPCS | Performed by: ADVANCED PRACTICE MIDWIFE

## 2020-09-02 PROCEDURE — 87624 HPV HI-RISK TYP POOLED RSLT: CPT | Performed by: ADVANCED PRACTICE MIDWIFE

## 2020-09-02 RX ORDER — PRENATAL VIT/IRON FUM/FOLIC AC 27MG-0.8MG
1 TABLET ORAL DAILY
COMMUNITY

## 2020-09-02 NOTE — PROGRESS NOTES
Lise Ronquillo presents requesting IUD removal.  Reason for IUD removal: desire for pregnancy, irregular and long cycles (50 days)  ?PROCEDURE:  A speculum was placed and the IUD strings were visualized.  The IUD strings were grasped with a ring forceps and gentle traction was applied.  The Paraguard IUD was easily removed and was noted to be intact.  The speculum was then removed.    A: Paraguard IUD removal well tolerated without complication  Cervical caner screening (Pap 2/2019 NILM with other HR HPV +)    P: Warning signs were reviewed with the patient including bleeding, pain and infection. Discussed preconception health, encouraged PNV for planned pregnancy (taking), reviewed fertility signs and when to start OPK and time IC. If no pregnancy in 6 mo RTC for evaluation.    Monica Awan CNM

## 2020-09-02 NOTE — NURSING NOTE
"Chief Complaint   Patient presents with     Procedure     Paragard inserted 2019, here for IUD removal due to wanting to try to conceive       Initial BP 90/52 (BP Location: Right arm, Cuff Size: Adult Regular)   Wt 54.9 kg (121 lb)   LMP 2020 (Exact Date)   Breastfeeding No   BMI 20.14 kg/m   Estimated body mass index is 20.14 kg/m  as calculated from the following:    Height as of 19: 1.651 m (5' 5\").    Weight as of this encounter: 54.9 kg (121 lb).  BP completed using cuff size: regular    Questioned patient about current smoking habits.  Pt. has never smoked.          The following HM Due: pap smear    Baljinder Carlson MA    "

## 2020-09-07 ENCOUNTER — VIRTUAL VISIT (OUTPATIENT)
Dept: FAMILY MEDICINE | Facility: OTHER | Age: 40
End: 2020-09-07

## 2020-09-07 NOTE — PROGRESS NOTES
"Date: 2020 18:54:36  Clinician: Atif Perea  Clinician NPI: [id number]  Patient: Lise Ronquillo  Patient : 1980  Patient Address: [address], [place] [address]  Patient Phone: [phone]  Visit Protocol: UTI  Patient Summary:  Lise is a 40 year old ( : 1980 ) female who initiated a Visit for a presumed bladder infection. When asked the question \"Please sign me up to receive news, health information and promotions. \", Lise responded \"No\".   Her symptoms started 4-6 days ago and consist of feeling as if the bladder is never empty, urinary frequency, urgency, and dysuria.   Symptom details   Urine color: The color of her urine is orange.    Denied symptoms include foul-smelling urine, nausea, flank pain, vaginal discharge, abdominal pain, chills, urinary incontinence, vomiting, and vaginal itching. She does not feel feverish.   Lise has not used any over-the-counter medications or home remedies to relieve her current symptoms.  Precipitating events  Lise denies having a sexually transmitted disease.  Pertinent medical history  Lise does not get yeast infections when she takes antibiotics. She has not experienced problems or side effects with any of the common antibiotics used to treat bladder infections.   Lise does not have a history of bladder infections or kidney stones. She has not used a catheter or been a patient in a hospital or nursing home in the past 2 weeks.   Lise does not smoke or use smokeless tobacco.   She denies pregnancy and denies breastfeeding. She has menstruated in the past month.   Reason for repeat visit for the same protocol within 24 hours:  Negative test  See the History of referred by protocol and completed visits section for details on previous visits (visits currently in queue to be diagnosed will not appear in this section).    MEDICATIONS: levothyroxine oral, ALLERGIES: NKDA  Clinician Response:  Dear " Lise,  Based on the information you have provided, you likely have an acute urinary tract infection, also called a bladder infection. Bladder infections occur when bacteria from the outside of the body enters the urinary tract. Any part of the urinary system can be infected, but the bladder is the most common.  Medication information  I am prescribing:     Nitrofurantoin monohyd/m-cryst (Macrobid) 100 mg oral capsule. Take 1 capsule by mouth every 12 hours for 5 days. Take this medication with food. There are no refills with this prescription.   The medication I prescribed for your bladder infection is an antibiotic. Continue taking the medication until it is gone even if you feel better.   Yeast infections can be a common side effect of antibiotics. The most common symptom of a yeast infection is itchiness in and around the vagina. Other signs and symptoms include burning, redness, or a thick, white vaginal discharge that looks like cottage cheese and does not have a bad smell.  Self care  Urination helps to flush bacteria from the urinary tract. For this reason, drinking water and urinating often helps relieve some urinary symptoms and can decrease your risk of getting bladder infections in the future.  Other steps you can take to prevent future bladder infections include:     Wipe front to back after using the bathroom    Urinate after sexual intercourse    Avoid using deodorant sprays, douches, or powders in the vaginal area     When to seek care  Please make an appointment to be seen in a clinic or urgent care if any of the following occur:     You develop new symptoms or your symptoms become worse    You have medication side effects that make it difficult to take them as prescribed    Your symptoms do not improve within 1-2 days of starting treatment    You have symptoms of a bladder infection that return shortly after completing treatment     It is possible to have an allergic reaction to an antibiotic  even if you have not had one in the past. If you notice a new rash, significant swelling, or difficulty breathing, stop taking this medication immediately and go to a clinic or urgent care.   Diagnosis: Acute uncomplicated bladder infection  Diagnosis ICD: N39.0  Prescription: nitrofurantoin monohyd/m-cryst (Macrobid) 100 mg oral capsule 10 capsule, 5 days supply. Take 1 capsule by mouth every 12 hours for 5 days. Refills: 0, Refill as needed: no, Allow substitutions: yes  Pharmacy: [facility] - [phone] - [address] [place] [id number]

## 2020-09-08 LAB
COPATH REPORT: NORMAL
PAP: NORMAL

## 2020-09-09 LAB
FINAL DIAGNOSIS: NORMAL
HPV HR 12 DNA CVX QL NAA+PROBE: NEGATIVE
HPV16 DNA SPEC QL NAA+PROBE: NEGATIVE
HPV18 DNA SPEC QL NAA+PROBE: NEGATIVE
SPECIMEN DESCRIPTION: NORMAL
SPECIMEN SOURCE CVX/VAG CYTO: NORMAL

## 2020-09-14 ENCOUNTER — PATIENT OUTREACH (OUTPATIENT)
Dept: OBGYN | Facility: CLINIC | Age: 40
End: 2020-09-14

## 2020-09-14 NOTE — TELEPHONE ENCOUNTER
9/25/07 Unsatisfactory pap  5/1/09 NIL pap  4/18/11 NIL pap, + HR HPV (type not specified)  5/20/11 [facility]x & ECC: neg  [gap in pap results]  2/28/19 NIL pap, + HR HPV (not 16/18). Plan 1 year cotest  9/2/20 NIL pap, neg HR HPV. Plan 1 year cotest

## 2020-11-16 ENCOUNTER — HEALTH MAINTENANCE LETTER (OUTPATIENT)
Age: 40
End: 2020-11-16

## 2021-08-18 ENCOUNTER — PATIENT OUTREACH (OUTPATIENT)
Dept: OBGYN | Facility: CLINIC | Age: 41
End: 2021-08-18
Payer: COMMERCIAL

## 2021-08-18 DIAGNOSIS — R87.810 CERVICAL HIGH RISK HPV (HUMAN PAPILLOMAVIRUS) TEST POSITIVE: ICD-10-CM

## 2021-08-18 NOTE — LETTER
August 18, 2021      Lise Ronquillo  [address] [place] [address]        Dear ,    This letter is to remind you that you are due for your follow-up Pap smear and Human Papillomavirus (HPV) test.    Please call [phone] to schedule your appointment at your earliest convenience.    If you have completed the appointment outside of the [facility] system, please have the records forwarded to our office. We will update your chart for your provider to review before your next annual wellness visit.     Thank you for choosing [facility]!      Sincerely,    Your [facility] Care Team

## 2021-09-18 ENCOUNTER — HEALTH MAINTENANCE LETTER (OUTPATIENT)
Age: 41
End: 2021-09-18

## 2021-11-02 DIAGNOSIS — E04.1 THYROID NODULE: ICD-10-CM

## 2021-11-02 DIAGNOSIS — E03.9 HYPOTHYROIDISM, UNSPECIFIED TYPE: ICD-10-CM

## 2021-11-02 RX ORDER — LEVOTHYROXINE SODIUM 175 UG/1
175 TABLET ORAL DAILY
Qty: 90 TABLET | Refills: 3 | Status: SHIPPED | OUTPATIENT
Start: 2021-11-02 | End: 2021-11-05

## 2021-11-05 RX ORDER — LEVOTHYROXINE SODIUM 175 UG/1
175 TABLET ORAL DAILY
Qty: 90 TABLET | Refills: 3 | Status: SHIPPED | OUTPATIENT
Start: 2021-11-05

## 2021-11-05 NOTE — TELEPHONE ENCOUNTER
LEVOTHYROXINE SODIUM 175MCG TABS  Resent order to updated/Alternative pharmacy for Pt care  11/5/2021  90 Tabs, 3 Refills sent to suzette Chow RN  Central Triage Red Flags/Med Refills

## 2021-11-10 NOTE — TELEPHONE ENCOUNTER
Patient due for Pap and HPV.    Reminder done today via telephone call - pt notified. She states she has switched insurance and therefore needs to follow up with different clinic. She no longer needs reminders. Lost to follow-up for pap tracking

## 2022-01-08 ENCOUNTER — HEALTH MAINTENANCE LETTER (OUTPATIENT)
Age: 42
End: 2022-01-08

## 2022-11-19 ENCOUNTER — HEALTH MAINTENANCE LETTER (OUTPATIENT)
Age: 42
End: 2022-11-19

## 2023-04-09 ENCOUNTER — HEALTH MAINTENANCE LETTER (OUTPATIENT)
Age: 43
End: 2023-04-09

## 2024-04-07 ENCOUNTER — HEALTH MAINTENANCE LETTER (OUTPATIENT)
Age: 44
End: 2024-04-07

## 2024-06-16 ENCOUNTER — HEALTH MAINTENANCE LETTER (OUTPATIENT)
Age: 44
End: 2024-06-16